# Patient Record
Sex: MALE | Race: WHITE | Employment: OTHER | URBAN - METROPOLITAN AREA
[De-identification: names, ages, dates, MRNs, and addresses within clinical notes are randomized per-mention and may not be internally consistent; named-entity substitution may affect disease eponyms.]

---

## 2020-07-07 ENCOUNTER — HOSPITAL ENCOUNTER (OUTPATIENT)
Dept: INFUSION THERAPY | Age: 85
Discharge: HOME | End: 2020-07-07
Payer: MEDICARE

## 2020-07-07 ENCOUNTER — HOSPITAL ENCOUNTER (OUTPATIENT)
Dept: INFUSION THERAPY | Age: 85
End: 2020-07-07
Payer: MEDICARE

## 2020-07-07 LAB
ALBUMIN SERPL-MCNC: 4.2 GM/DL (ref 3.4–5)
ALP BLD-CCNC: 121 IU/L (ref 40–129)
ALT SERPL-CCNC: 19 U/L (ref 10–40)
ANION GAP SERPL CALCULATED.3IONS-SCNC: 12 MMOL/L (ref 4–16)
AST SERPL-CCNC: 18 IU/L (ref 15–37)
BASOPHILS ABSOLUTE: 0 K/CU MM
BASOPHILS RELATIVE PERCENT: 0.1 % (ref 0–1)
BILIRUB SERPL-MCNC: 0.4 MG/DL (ref 0–1)
BUN BLDV-MCNC: 28 MG/DL (ref 6–23)
CALCIUM SERPL-MCNC: 9.1 MG/DL (ref 8.3–10.6)
CHLORIDE BLD-SCNC: 104 MMOL/L (ref 99–110)
CO2: 26 MMOL/L (ref 21–32)
CREAT SERPL-MCNC: 1.4 MG/DL (ref 0.9–1.3)
DIFFERENTIAL TYPE: ABNORMAL
EOSINOPHILS ABSOLUTE: 0.2 K/CU MM
EOSINOPHILS RELATIVE PERCENT: 1.2 % (ref 0–3)
GFR AFRICAN AMERICAN: 58 ML/MIN/1.73M2
GFR NON-AFRICAN AMERICAN: 48 ML/MIN/1.73M2
GLUCOSE BLD-MCNC: 119 MG/DL (ref 70–99)
HCT VFR BLD CALC: 47.4 % (ref 42–52)
HEMOGLOBIN: 15.8 GM/DL (ref 13.5–18)
LACTATE DEHYDROGENASE: 204 IU/L (ref 120–246)
LYMPHOCYTES ABSOLUTE: 6.5 K/CU MM
LYMPHOCYTES RELATIVE PERCENT: 48.6 % (ref 24–44)
MCH RBC QN AUTO: 30.2 PG (ref 27–31)
MCHC RBC AUTO-ENTMCNC: 33.3 % (ref 32–36)
MCV RBC AUTO: 90.5 FL (ref 78–100)
MONOCYTES ABSOLUTE: 0.6 K/CU MM
MONOCYTES RELATIVE PERCENT: 4.3 % (ref 0–4)
PDW BLD-RTO: 14.3 % (ref 11.7–14.9)
PLATELET # BLD: 209 K/CU MM (ref 140–440)
PMV BLD AUTO: 9.8 FL (ref 7.5–11.1)
POTASSIUM SERPL-SCNC: 4.9 MMOL/L (ref 3.5–5.1)
RBC # BLD: 5.24 M/CU MM (ref 4.6–6.2)
SEGMENTED NEUTROPHILS ABSOLUTE COUNT: 6.1 K/CU MM
SEGMENTED NEUTROPHILS RELATIVE PERCENT: 45.8 % (ref 36–66)
SODIUM BLD-SCNC: 142 MMOL/L (ref 135–145)
TOTAL PROTEIN: 6 GM/DL (ref 6.4–8.2)
WBC # BLD: 13.4 K/CU MM (ref 4–10.5)

## 2020-07-07 PROCEDURE — G0463 HOSPITAL OUTPT CLINIC VISIT: HCPCS

## 2020-07-07 PROCEDURE — 36415 COLL VENOUS BLD VENIPUNCTURE: CPT

## 2020-07-07 PROCEDURE — 85025 COMPLETE CBC W/AUTO DIFF WBC: CPT

## 2020-07-07 PROCEDURE — 83615 LACTATE (LD) (LDH) ENZYME: CPT

## 2020-07-07 PROCEDURE — 80053 COMPREHEN METABOLIC PANEL: CPT

## 2020-07-07 PROCEDURE — 99201 HC NEW PT, OUTPT VISIT LEVEL 1: CPT

## 2020-07-14 ENCOUNTER — OFFICE VISIT (OUTPATIENT)
Dept: INTERNAL MEDICINE CLINIC | Age: 85
End: 2020-07-14
Payer: MEDICARE

## 2020-07-14 VITALS
RESPIRATION RATE: 14 BRPM | DIASTOLIC BLOOD PRESSURE: 82 MMHG | BODY MASS INDEX: 23.94 KG/M2 | HEART RATE: 59 BPM | SYSTOLIC BLOOD PRESSURE: 138 MMHG | OXYGEN SATURATION: 97 % | HEIGHT: 71 IN | WEIGHT: 171 LBS

## 2020-07-14 PROCEDURE — 90670 PCV13 VACCINE IM: CPT | Performed by: INTERNAL MEDICINE

## 2020-07-14 PROCEDURE — 3023F SPIROM DOC REV: CPT | Performed by: INTERNAL MEDICINE

## 2020-07-14 PROCEDURE — 1123F ACP DISCUSS/DSCN MKR DOCD: CPT | Performed by: INTERNAL MEDICINE

## 2020-07-14 PROCEDURE — G8420 CALC BMI NORM PARAMETERS: HCPCS | Performed by: INTERNAL MEDICINE

## 2020-07-14 PROCEDURE — G8926 SPIRO NO PERF OR DOC: HCPCS | Performed by: INTERNAL MEDICINE

## 2020-07-14 PROCEDURE — 1036F TOBACCO NON-USER: CPT | Performed by: INTERNAL MEDICINE

## 2020-07-14 PROCEDURE — G0009 ADMIN PNEUMOCOCCAL VACCINE: HCPCS | Performed by: INTERNAL MEDICINE

## 2020-07-14 PROCEDURE — 4040F PNEUMOC VAC/ADMIN/RCVD: CPT | Performed by: INTERNAL MEDICINE

## 2020-07-14 PROCEDURE — 99204 OFFICE O/P NEW MOD 45 MIN: CPT | Performed by: INTERNAL MEDICINE

## 2020-07-14 PROCEDURE — G8427 DOCREV CUR MEDS BY ELIG CLIN: HCPCS | Performed by: INTERNAL MEDICINE

## 2020-07-14 RX ORDER — M-VIT,TX,IRON,MINS/CALC/FOLIC 27MG-0.4MG
1 TABLET ORAL DAILY
COMMUNITY

## 2020-07-14 SDOH — HEALTH STABILITY: MENTAL HEALTH: HOW OFTEN DO YOU HAVE A DRINK CONTAINING ALCOHOL?: NEVER

## 2020-07-14 ASSESSMENT — PATIENT HEALTH QUESTIONNAIRE - PHQ9
SUM OF ALL RESPONSES TO PHQ9 QUESTIONS 1 & 2: 0
SUM OF ALL RESPONSES TO PHQ QUESTIONS 1-9: 0
1. LITTLE INTEREST OR PLEASURE IN DOING THINGS: 0
SUM OF ALL RESPONSES TO PHQ QUESTIONS 1-9: 0
2. FEELING DOWN, DEPRESSED OR HOPELESS: 0

## 2020-07-14 NOTE — PROGRESS NOTES
tablet Take by mouth daily        No current facility-administered medications for this visit. Past Medical History:   Diagnosis Date    [facility] 2014    dx'd 2014, sees Dr Woody Child    COPD (chronic obstructive pulmonary disease) ([facility] 2018    INCIDENTAL FINDING ON CHEST CT 2018- NEVER SMOKED AND NO SX, NO INHALERS. No past surgical history on file. No family history on file. Social History     Socioeconomic History    Marital status:      Spouse name: Not on file    Number of children: Not on file    Years of education: Not on file    Highest education level: Not on file   Occupational History    Not on file   Social Needs    Financial resource strain: Not on file    Food insecurity     Worry: Not on file     Inability: Not on file    Transportation needs     Medical: Not on file     Non-medical: Not on file   Tobacco Use    Smoking status: Never Smoker    Smokeless tobacco: Never Used   Substance and Sexual Activity    Alcohol use: Never     Frequency: Never    Drug use: Never    Sexual activity: Not on file   Lifestyle    Physical activity     Days per week: Not on file     Minutes per session: Not on file    Stress: Not on file   Relationships    Social connections     Talks on phone: Not on file     Gets together: Not on file     Attends Yazdanism service: Not on file     Active member of club or organization: Not on file     Attends meetings of clubs or organizations: Not on file     Relationship status: Not on file    Intimate partner violence     Fear of current or ex partner: Not on file     Emotionally abused: Not on file     Physically abused: Not on file     Forced sexual activity: Not on file   Other Topics Concern    Not on file   Social History Narrative    Not on file       Review of Systems:  A comprehensive review of systems was negative except for what was noted in the HPI.     Physical Exam:   Vitals:    07/14/20 1004 Future    Simple chronic bronchitis ([facility]- ASYMPTOMATIC, NO INDICATION AT THIS POINT FOR INHALERS, GIVE PREVNAR, NEXT TIME PNEUMOVAX    Macular degeneration of right eye, unspecified type- F/U DR GONSALEZ    Mixed hyperlipidemia- F/U FASTING LAB  -     Comprehensive Metabolic Panel; Future  -     Lipid Panel; Future    Vitamin D deficiency- CHECK LEVELS  -     Vitamin D 25 Hydroxy; Future    Fatigue, unspecified type- SCR LAB  -     TSH without Reflex; Future  -     Vitamin B12; Future    Need for shingles vaccine  -     zoster recombinant adjuvanted vaccine [facility]) 50 MCG/0.5ML SUSR injection; Inject 0.5 mLs into the muscle once for 1 dose    Need for prophylactic vaccination against Streptococcus pneumoniae (pneumococcus)  -     PREVNAR 13 IM (Pneumococcal conjugate vaccine 13-valent)    Cardiac murmur- WILL CONSIDER ECHO LATER, SUSPECT CLINICALLY W CALCIFICATION OF AORTIC VALVE.

## 2020-10-01 NOTE — PROGRESS NOTES
Patient Name: Berny Pichardo  Patient : 1930  Patient MRN: [id number]     Primary Oncologist: Devorah Casper MD  Referring Provider: Aria Patton MD     Date of Service: 10/8/2020      Chief Complaint: No chief complaint on file. He came in for follow-up visit. Patient Active Problem List:     CLL (chronic lymphocytic leukemia) ([facility]     COPD (chronic obstructive pulmonary disease) ([facility]     Macular degeneration     Cardiac murmur     HPI:   Mr. Lanny Marinelli is a pleasant 80-year-old  male patient who is a retired general surgeon who was diagnosed with chronic lymphocytic leukemia In 2014. He had flow cytometry study. He was seen by Dr. Romeo Archuleta in [place]. He received IVIG in 2014 ×2 doses. In 2014 IgA was 114, IgG 1453, IgM 37. 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 He had anemia and blood tests in 2018 showed ferritin 353, iron binding 2056, saturation 13%, saturation 179. B12 was 1980. He is taking multivitamins and Hemoglobin went up to 15.4 in 2019 and remained stable in the range of 15. CT chest from 2018 showed:  1. Pulmonary emphysema with stable bilateral parenchymal scaring. 2. Stable multiple small subcentimeter pulmonary nodules. 3. Bilateral axillary and mediastinal adenopathy not significantly changed. Periportal adenopathy. 4. Worsening splenomegaly with splenic varices. He started Ibrutinib on May 16, 2018. On 2018 peripheral smear showed marketed lymphocytosis with smudge cells consistent with CLL. No blasts or acute leukemia transformation seen. Mild normocytic hypochromic anemia without schistocytes. WBC was 200.1, platelet 731, RBC 2.47. He has history of lung nodule. CT chest from 2018 showed stable lung nodules. No new investigations at this time given the progression in his CLL. Reportedly he has been compliant to his ibrutinib at 200 mg daily.   CT scan will be ordered for any signs of progression. So far he has been tolerating Ibrutinib. He already had refill. Reportedly he has been followed every 2 to 3 months by Dr. Deena Iqbal. On October 8, 2020 he came in for follow-up visit. He claims he has been adherent to his ibrutinib. He was seen by family doctor in August 2020. He gets to pneumonia shot. He will consider flu shot. Labs in July 2020 were reviewed. I will check labs today. He denies any B symptoms on today's visit. He denies any nausea, vomiting or diarrhea. No fever or chills. No intentional weight loss or night sweats. Past Medical History  Anemia of iron deficiency, CLL, macular degeneration, BPH. Surgical History  Colonoscopy, TURP for BPH In 2009. Left hernia repair in November 2018. Allergies  Reviewed as per chart. Medications  Reviewed as per chart. Social History  Smoking Status Never smoker  He denies any alcohol or illicit drug use. Family History  Mother had diabetes congestive heart failure. Son had cancer with metastatic disease to the liver. Review of Systems: \"Per interval history; otherwise 10 point ROS is negative. \"     Vital Signs: There were no vitals taken for this visit.     Physical Exam:  CONSTITUTIONAL: awake, alert, cooperative, no apparent distress   EYES: pupils equal, round and reactive to light, sclera clear and conjunctiva normal  ENT: Normocephalic, without obvious abnormality, atraumatic  NECK: supple, symmetrical, no jugular venous distension and no carotid bruits   HEMATOLOGIC/LYMPHATIC: no cervical, supraclavicular or axillary lymphadenopathy   LUNGS: no increased work of breathing and clear to auscultation   CARDIOVASCULAR: regular rate and rhythm, normal S1 and S2, + murmur noted  ABDOMEN: normal bowel sounds x 4, soft, non-distended, non-tender, no masses palpated, no hepatosplenomegaly   MUSCULOSKELETAL: full range of motion noted, tone is normal  NEUROLOGIC: awake, alert, oriented to name, place and time. Cranial nerves II through XII grossly intact. SKIN: Normal skin color, texture, turgor and no jaundice. appears intact   EXTREMITIES: no LE edema. No cyanosis. Labs:  Hematology:  Lab Results   Component Value Date    WBC 13.4 (H) 07/07/2020    RBC 5.24 07/07/2020    HGB 15.8 07/07/2020    HCT 47.4 07/07/2020    MCV 90.5 07/07/2020    MCH 30.2 07/07/2020    MCHC 33.3 07/07/2020    RDW 14.3 07/07/2020     07/07/2020    MPV 9.8 07/07/2020    SEGSPCT 45.8 07/07/2020    EOSRELPCT 1.2 07/07/2020    BASOPCT 0.1 07/07/2020    LYMPHOPCT 48.6 (H) 07/07/2020    MONOPCT 4.3 (H) 07/07/2020    SEGSABS 6.1 07/07/2020    EOSABS 0.2 07/07/2020    BASOSABS 0.0 07/07/2020    LYMPHSABS 6.5 07/07/2020    MONOSABS 0.6 07/07/2020    DIFFTYPE AUTOMATED DIFFERENTIAL 07/07/2020     Chemistry:  Lab Results   Component Value Date     07/07/2020    K 4.9 07/07/2020     07/07/2020    CO2 26 07/07/2020    BUN 28 (H) 07/07/2020    CREATININE 1.4 (H) 07/07/2020    GLUCOSE 119 (H) 07/07/2020    CALCIUM 9.1 07/07/2020    PROT 6.0 (L) 07/07/2020    LABALBU 4.2 07/07/2020    BILITOT 0.4 07/07/2020    ALKPHOS 121 07/07/2020    AST 18 07/07/2020    ALT 19 07/07/2020    LABGLOM 48 (L) 07/07/2020    GFRAA 58 (L) 07/07/2020     Lab Results   Component Value Date     07/07/2020     Immunology:  Lab Results   Component Value Date    PROT 6.0 (L) 07/07/2020      Observations:  No data recorded      Assessment & Plan:    1. 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 He started ibrutinib in May 2018. Labs in July 2020 were reviewed. I will have repeat CBC, CMP and LDH today. We discussed about follow-up imaging study if he develops symptoms to suggest progression of the disease. I will add a GVH study. I advised to call for the test result of the blood test.    2. He received IVIG x2 in the past. Follow-up immunoglobulin study was unremarkable. 3. He has history of anemia. Currently anemia is corrected.  He had colonoscopy. 4. We discussed about healthy diet and lifestyle. Return to clinic in 3 months or sooner. All of his question has been answered for today. Recent imaging and labs were reviewed and discussed with the patient.       Electronically signed by Aylin Roy MD on 10/1/20 at 7:42 AM EDT

## 2020-10-08 ENCOUNTER — HOSPITAL ENCOUNTER (OUTPATIENT)
Dept: INFUSION THERAPY | Age: 85
Discharge: HOME | End: 2020-10-08
Payer: MEDICARE

## 2020-10-08 ENCOUNTER — OFFICE VISIT (OUTPATIENT)
Dept: ONCOLOGY | Age: 85
End: 2020-10-08
Payer: MEDICARE

## 2020-10-08 VITALS
BODY MASS INDEX: 23.43 KG/M2 | DIASTOLIC BLOOD PRESSURE: 86 MMHG | TEMPERATURE: 97.8 F | HEIGHT: 72 IN | SYSTOLIC BLOOD PRESSURE: 167 MMHG | WEIGHT: 173 LBS | HEART RATE: 65 BPM | OXYGEN SATURATION: 93 %

## 2020-10-08 DIAGNOSIS — C91.10 CLL (CHRONIC LYMPHOCYTIC LEUKEMIA) (HCC): ICD-10-CM

## 2020-10-08 LAB
ALBUMIN SERPL-MCNC: 4.3 GM/DL (ref 3.4–5)
ALP BLD-CCNC: 113 IU/L (ref 40–128)
ALT SERPL-CCNC: 19 U/L (ref 10–40)
ANION GAP SERPL CALCULATED.3IONS-SCNC: 10 MMOL/L (ref 4–16)
AST SERPL-CCNC: 24 IU/L (ref 15–37)
BASOPHILS ABSOLUTE: 0 K/CU MM
BASOPHILS RELATIVE PERCENT: 0.1 % (ref 0–1)
BILIRUB SERPL-MCNC: 0.7 MG/DL (ref 0–1)
BUN BLDV-MCNC: 21 MG/DL (ref 6–23)
CALCIUM SERPL-MCNC: 9.3 MG/DL (ref 8.3–10.6)
CHLORIDE BLD-SCNC: 102 MMOL/L (ref 99–110)
CO2: 26 MMOL/L (ref 21–32)
CREAT SERPL-MCNC: 1.5 MG/DL (ref 0.9–1.3)
DIFFERENTIAL TYPE: ABNORMAL
EOSINOPHILS ABSOLUTE: 0.2 K/CU MM
EOSINOPHILS RELATIVE PERCENT: 1.1 % (ref 0–3)
GFR AFRICAN AMERICAN: 53 ML/MIN/1.73M2
GFR NON-AFRICAN AMERICAN: 44 ML/MIN/1.73M2
GLUCOSE BLD-MCNC: 97 MG/DL (ref 70–99)
HCT VFR BLD CALC: 46.2 % (ref 42–52)
HEMOGLOBIN: 15.4 GM/DL (ref 13.5–18)
LACTATE DEHYDROGENASE: 232 IU/L (ref 120–246)
LYMPHOCYTES ABSOLUTE: 8.1 K/CU MM
LYMPHOCYTES RELATIVE PERCENT: 52 % (ref 24–44)
MCH RBC QN AUTO: 30.4 PG (ref 27–31)
MCHC RBC AUTO-ENTMCNC: 33.3 % (ref 32–36)
MCV RBC AUTO: 91.1 FL (ref 78–100)
MONOCYTES ABSOLUTE: 0.9 K/CU MM
MONOCYTES RELATIVE PERCENT: 6 % (ref 0–4)
PDW BLD-RTO: 14.7 % (ref 11.7–14.9)
PLATELET # BLD: 174 K/CU MM (ref 140–440)
PMV BLD AUTO: 9.7 FL (ref 7.5–11.1)
POTASSIUM SERPL-SCNC: 4.7 MMOL/L (ref 3.5–5.1)
RBC # BLD: 5.07 M/CU MM (ref 4.6–6.2)
SEGMENTED NEUTROPHILS ABSOLUTE COUNT: 6.3 K/CU MM
SEGMENTED NEUTROPHILS RELATIVE PERCENT: 40.8 % (ref 36–66)
SODIUM BLD-SCNC: 138 MMOL/L (ref 135–145)
TOTAL PROTEIN: 5.8 GM/DL (ref 6.4–8.2)
WBC # BLD: 15.5 K/CU MM (ref 4–10.5)

## 2020-10-08 PROCEDURE — 36415 COLL VENOUS BLD VENIPUNCTURE: CPT

## 2020-10-08 PROCEDURE — 1123F ACP DISCUSS/DSCN MKR DOCD: CPT | Performed by: INTERNAL MEDICINE

## 2020-10-08 PROCEDURE — G8427 DOCREV CUR MEDS BY ELIG CLIN: HCPCS | Performed by: INTERNAL MEDICINE

## 2020-10-08 PROCEDURE — 1036F TOBACCO NON-USER: CPT | Performed by: INTERNAL MEDICINE

## 2020-10-08 PROCEDURE — 85025 COMPLETE CBC W/AUTO DIFF WBC: CPT

## 2020-10-08 PROCEDURE — 99213 OFFICE O/P EST LOW 20 MIN: CPT | Performed by: INTERNAL MEDICINE

## 2020-10-08 PROCEDURE — G8420 CALC BMI NORM PARAMETERS: HCPCS | Performed by: INTERNAL MEDICINE

## 2020-10-08 PROCEDURE — 83615 LACTATE (LD) (LDH) ENZYME: CPT

## 2020-10-08 PROCEDURE — G8484 FLU IMMUNIZE NO ADMIN: HCPCS | Performed by: INTERNAL MEDICINE

## 2020-10-08 PROCEDURE — 4040F PNEUMOC VAC/ADMIN/RCVD: CPT | Performed by: INTERNAL MEDICINE

## 2020-10-08 PROCEDURE — 99211 OFF/OP EST MAY X REQ PHY/QHP: CPT

## 2020-10-08 PROCEDURE — 80053 COMPREHEN METABOLIC PANEL: CPT

## 2020-10-08 ASSESSMENT — PATIENT HEALTH QUESTIONNAIRE - PHQ9
2. FEELING DOWN, DEPRESSED OR HOPELESS: 0
SUM OF ALL RESPONSES TO PHQ QUESTIONS 1-9: 0
SUM OF ALL RESPONSES TO PHQ9 QUESTIONS 1 & 2: 0
1. LITTLE INTEREST OR PLEASURE IN DOING THINGS: 0
SUM OF ALL RESPONSES TO PHQ QUESTIONS 1-9: 0

## 2020-10-17 LAB — IVGH MUTATION: NORMAL

## 2020-10-20 ENCOUNTER — OFFICE VISIT (OUTPATIENT)
Dept: INTERNAL MEDICINE CLINIC | Age: 85
End: 2020-10-20
Payer: MEDICARE

## 2020-10-20 VITALS
RESPIRATION RATE: 16 BRPM | SYSTOLIC BLOOD PRESSURE: 128 MMHG | OXYGEN SATURATION: 97 % | BODY MASS INDEX: 23.43 KG/M2 | WEIGHT: 173 LBS | HEART RATE: 63 BPM | DIASTOLIC BLOOD PRESSURE: 72 MMHG | HEIGHT: 72 IN

## 2020-10-20 PROCEDURE — 90732 PPSV23 VACC 2 YRS+ SUBQ/IM: CPT | Performed by: INTERNAL MEDICINE

## 2020-10-20 PROCEDURE — G0009 ADMIN PNEUMOCOCCAL VACCINE: HCPCS | Performed by: INTERNAL MEDICINE

## 2020-10-20 PROCEDURE — G8482 FLU IMMUNIZE ORDER/ADMIN: HCPCS | Performed by: INTERNAL MEDICINE

## 2020-10-20 PROCEDURE — 4040F PNEUMOC VAC/ADMIN/RCVD: CPT | Performed by: INTERNAL MEDICINE

## 2020-10-20 PROCEDURE — 1123F ACP DISCUSS/DSCN MKR DOCD: CPT | Performed by: INTERNAL MEDICINE

## 2020-10-20 PROCEDURE — G0439 PPPS, SUBSEQ VISIT: HCPCS | Performed by: INTERNAL MEDICINE

## 2020-10-20 ASSESSMENT — PATIENT HEALTH QUESTIONNAIRE - PHQ9
SUM OF ALL RESPONSES TO PHQ QUESTIONS 1-9: 0
SUM OF ALL RESPONSES TO PHQ QUESTIONS 1-9: 0
1. LITTLE INTEREST OR PLEASURE IN DOING THINGS: 0
SUM OF ALL RESPONSES TO PHQ9 QUESTIONS 1 & 2: 0
2. FEELING DOWN, DEPRESSED OR HOPELESS: 0
SUM OF ALL RESPONSES TO PHQ QUESTIONS 1-9: 0

## 2020-10-20 ASSESSMENT — LIFESTYLE VARIABLES: HOW OFTEN DO YOU HAVE A DRINK CONTAINING ALCOHOL: 0

## 2020-10-20 NOTE — PROGRESS NOTES
Medicare Annual Wellness Visit  Name: Hiro Eye Date: 10/20/2020   MRN: [id number] Sex: Male   Age: 80 y.o. Ethnicity: Non-/Non    : 1930 Race: Iman Young is here for Medicare AWV    Screenings for behavioral, psychosocial and functional/safety risks, and cognitive dysfunction are all negative except as indicated below. These results, as well as other patient data from the [address] form, are documented in Flowsheets linked to this Encounter. LEUKEMIA, SEEING DR Araseli Lara AND COUNTS ALSO LDH STABLE. HAD FLU SHOT LAST WEEK    DUE FOR PNEUMOVAX. No Known Allergies    Prior to Visit Medications    Medication Sig Taking? Authorizing Provider   Multiple Vitamins-Minerals (THERAPEUTIC MULTIVITAMIN-MINERALS) tablet Take 1 tablet by mouth daily Yes Historical Provider, MD   ibrutinib (IMBRUVICA) 420 MG tablet Take by mouth daily  Yes Historical Provider, MD       Past Medical History:   Diagnosis Date    Cardiac murmur     DEANNA noted on exam 20--- monitoring for now, consider echo    CLL (chronic lymphocytic leukemia) ([facility]     dx'd , sees Dr Aylin Roy    COPD (chronic obstructive pulmonary disease) ([facility]     INCIDENTAL FINDING ON CHEST CT 2018- NEVER SMOKED AND NO SX, NO INHALERS.  Macular degeneration     R>L, gets injections, is to see Dr Loree Phoenix.        Past Surgical History:   Procedure Laterality Date    INGUINAL HERNIA REPAIR      remote, unsure which side       Family History   Problem Relation Age of Onset    Diabetes Mother     ADHD Father        CareTeam (Including outside providers/suppliers regularly involved in providing care):   Patient Care Team:  Omar Boudreaux MD as PCP - General (Internal Medicine)  Omar Boudreaux MD as PCP - [facility] Empaneled Provider    Wt Readings from Last 3 Encounters:   10/20/20 173 lb (78.5 kg)   10/08/20 173 lb (78.5 kg)   20 171 lb (77.6 kg)     Vitals:    10/20/20 1011   BP: 128/72   Pulse: 63   Resp: 16   SpO2: 97%   Weight: 173 lb (78.5 kg)   Height: 5' 11.5\" (1.816 m)     Body mass index is 23.79 kg/m². Based upon direct observation of the patient, evaluation of cognition reveals recent and remote memory intact. General Appearance: alert and oriented to person, place and time, well developed and well- nourished, in no acute distress  Skin: warm and dry, no rash or erythema  Head: normocephalic and atraumatic  Eyes: pupils equal, round, and reactive to light, extraocular eye movements intact, conjunctivae normal  ENT: tympanic membrane, external ear and ear canal normal bilaterally, nose without deformity, nasal mucosa and turbinates normal without polyps  Neck: supple and non-tender without mass, no thyromegaly or thyroid nodules, no cervical lymphadenopathy  Pulmonary/Chest: clear to auscultation bilaterally- no wheezes, rales or rhonchi, normal air movement, no respiratory distress  Cardiovascular: normal rate, regular rhythm, normal S1 and S2, DEANNA AT LLSB RAD TO NECK, no  rubs, clicks, or gallops, distal pulses intact, no carotid bruits  Abdomen: soft, non-tender, non-distended, normal bowel sounds, no masses or organomegaly  Extremities: no cyanosis, clubbing or edema  Musculoskeletal: normal range of motion, no joint swelling, deformity or tenderness  Neurologic:  no cranial nerve deficit, gait, coordination and speech normal    Patient's complete Health Risk Assessment and screening values have been reviewed and are found in Flowsheets. The following problems were reviewed today and where indicated follow up appointments were made and/or referrals ordered. Positive Risk Factor Screenings with Interventions:     General Health and ACP:  General  In general, how would you say your health is?: Very Good  In the past 7 days, have you experienced any of the following?  New or Increased Pain, New or Increased Fatigue, Loneliness, Social Isolation, Stress or Anger?: None of These  Do you get the social and emotional support that you need?: Yes  Do you have a Living Will?: Yes  Advance Directives     Power of  Living Will ACP-Advance Directive ACP-Power of     Not on File Not on File Filed [address] Risk Interventions:  · HAS LW    Health Habits/Nutrition:  Health Habits/Nutrition  Do you exercise for at least 20 minutes 2-3 times per week?: (!) No  Have you lost any weight without trying in the past 3 months?: No  Do you eat fewer than 2 meals per day?: No  Body mass index: 23.79  Health Habits/Nutrition Interventions:  · WALKS 2MI/DAY 6D/WEEK. Hearing/Vision:  No exam data present  Hearing/Vision  Do you or your family notice any trouble with your hearing?: No  Do you have difficulty driving, watching TV, or doing any of your daily activities because of your eyesight?: (!) Yes  Have you had an eye exam within the past year?: Yes  Hearing/Vision Interventions:  · [address], HAS BASELINE DECR VISION    ADL:  ADLs  In the past 7 days, did you need help from others to perform any of the following everyday activities? Eating, dressing, grooming, bathing, toileting, or walking/balance?: None  In the past 7 days, did you need help from others to take care of any of the following?  Laundry, housekeeping, banking/finances, shopping, telephone use, food preparation, transportation, or taking medications?: (!) Transportation  ADL Interventions:  · [address] [facility]    Personalized Preventive Plan   Current Health Maintenance Status  Immunization History   Administered Date(s) Administered    Influenza, High Dose (Fluzone 65 yrs and older) 10/12/2020    Pneumococcal Conjugate 13-valent (Genette Guise) 07/14/2020        Health Maintenance   Topic Date Due    DTaP/Tdap/Td vaccine (1 - Tdap) 01/24/1949    Shingles Vaccine (1 of 2) 01/24/1980    Annual Wellness Visit (AWV)  07/12/2020    Pneumococcal 65+ yrs at Risk Vaccine (2 of 2 - PPSV23) 07/14/2021    Flu vaccine  Completed    Hepatitis A vaccine  Aged Out    Hepatitis B vaccine  Aged Out    Hib vaccine  Aged Out    Meningococcal (ACWY) vaccine  Aged Out     Recommendations for Bleacher Report Due: see orders and patient instructions/AVS.  . Recommended screening schedule for the next 5-10 years is provided to the patient in written form: see Patient Cristina Neville was seen today for medicare awv. Diagnoses and all orders for this visit:    Routine general medical examination at a health care facility - remains independent, functional and active, no indications/needs for other interventions noted at this time- except as noted below and also findings noted on screening medicare questions. Hyperlipemia, mixed  - Pt will continue to work on a low fat diet and also exercise, wt loss as appropriate. Will continue periodic monitoring of fasting lipid profile, glucose, liver function. To draw lab w next testing fr Dr Ace Marquez  -     TSH with Reflex; Future  -     LIPID PANEL; Future    CLL (chronic lymphocytic leukemia) ([facility]- stable monitored by Dr Ace Marquez    Cardiac murmur- suspect AS, denies sx lt headedness, cp or sob.   Monitor, pt refused/defers echo or cardiology eval.    Macular degeneration of right eye, unspecified type- cont inj w Dr Zonia Reyes    Vitamin D deficiency- check levels  -     VITAMIN D 25 HYDROXY; Future    Need for prophylactic vaccination against Streptococcus pneumoniae (pneumococcus)  -     PNEUMOVAX 23 subcutaneous/IM (Pneumococcal polysaccharide vaccine 23-valent >= 1yo)

## 2020-10-20 NOTE — PATIENT INSTRUCTIONS
Personalized Preventive Plan for Brayan León - 10/20/2020  Medicare offers a range of preventive health benefits. Some of the tests and screenings are paid in full while other may be subject to a deductible, co-insurance, and/or copay. Some of these benefits include a comprehensive review of your medical history including lifestyle, illnesses that may run in your family, and various assessments and screenings as appropriate. After reviewing your medical record and screening and assessments performed today your provider may have ordered immunizations, labs, imaging, and/or referrals for you. A list of these orders (if applicable) as well as your Preventive Care list are included within your After Visit Summary for your review. Other Preventive Recommendations:    · A preventive eye exam performed by an eye specialist is recommended every 1-2 years to screen for glaucoma; cataracts, macular degeneration, and other eye disorders. · A preventive dental visit is recommended every 6 months. · Try to get at least 150 minutes of exercise per week or 10,000 steps per day on a pedometer . · Order or download the FREE \"Exercise & Physical Activity: Your Everyday Guide\" from [employer]. Call 9-912.468.7750 or search [employer] online. · You need 9775-5377 mg of calcium and 8756-2465 IU of vitamin D per day. It is possible to meet your calcium requirement with diet alone, but a vitamin D supplement is usually necessary to meet this goal.  · When exposed to the sun, use a sunscreen that protects against both UVA and UVB radiation with an SPF of 30 or greater. Reapply every 2 to 3 hours or after sweating, drying off with a towel, or swimming. · Always wear a seat belt when traveling in a car. Always wear a helmet when riding a bicycle or motorcycle.

## 2021-01-05 NOTE — PROGRESS NOTES
Patient Name: Mark Joel  Patient : 1930  Patient MRN: [id number]     Primary Oncologist: Casey Baumann MD  Referring Provider: Fabio Simpson MD     Date of Service: 2021      Chief Complaint:   Chief Complaint   Patient presents with    Follow-up     He came in for follow-up visit. 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 He had flow cytometry study. He was seen by Dr. Cathy Ramey in [place]. He received IVIG in 2014 ×2 doses. In 2014 IgA was 114, IgG 1453, IgM 37. 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 He had anemia and blood tests in 2018 showed ferritin 353, iron binding 2056, saturation 13%, saturation 179. B12 was 1980. He is taking multivitamins and Hemoglobin went up to 15.4 in 2019 and remained stable in the range of 15. CT chest from 2018 showed:  1. Pulmonary emphysema with stable bilateral parenchymal scaring. 2. Stable multiple small subcentimeter pulmonary nodules. 3. Bilateral axillary and mediastinal adenopathy not significantly changed. Periportal adenopathy. 4. Worsening splenomegaly with splenic varices. He started Ibrutinib on May 16, 2018. On 2018 peripheral smear showed marketed lymphocytosis with smudge cells consistent with CLL. No blasts or acute leukemia transformation seen. Mild normocytic hypochromic anemia without schistocytes. WBC was 200.1, platelet 349, RBC 9.38. He has history of lung nodule. CT chest from 2018 showed stable lung nodules. No new investigations at this time given the progression in his CLL.   Reportedly he has been compliant to his ibrutinib at 200 mg daily. CT scan will be ordered for any signs of progression. So far he has been tolerating Ibrutinib. He already had refill. Reportedly he has been followed every 2 to 3 months by Dr. Cathy Ramey. He was seen by family doctor in August 2020. Labs in July 2020 were reviewed. On January 12, 2021 he came in for follow-up visit. IGVH study showed admitted unmutated result which is unfavorable. He had Covid vaccine on January 10, 2021. He denied any side effects of the COVID-19 vaccine. He had flu shot in 2020. He claims he has been taking his ibrutinib. We will check labs today including LDH CBC and CMP. He denies any B symptoms on today's visit. He denies any nausea, vomiting or diarrhea. No fever or chills. Denied any intentional weight loss or night sweats. Past Medical History  Anemia of iron deficiency, CLL, macular degeneration, BPH. Surgical History  Colonoscopy, TURP for BPH In 2009. Left hernia repair in November 2018. Social History  Smoking Status Never smoker  He denies any alcohol or illicit drug use. Family History  Mother had diabetes congestive heart failure. Son had cancer with metastatic disease to the liver. Review of Systems: \"Per interval history; otherwise 10 point ROS is negative. \"     Vital Signs:  BP (!) 168/92 (Site: Right Upper Arm, Position: Sitting, Cuff Size: Medium Adult)   Pulse 65   Temp 96.8 °F (36 °C) (Infrared)   Resp 16   Ht 5' 11.5\" (1.816 m)   Wt 174 lb (78.9 kg)   SpO2 95%   BMI 23.93 kg/m²     Physical Exam:  CONSTITUTIONAL: awake, alert, cooperative, no apparent distress   EYES: pupils equal, round and reactive to light, sclera clear and conjunctiva normal  ENT: Normocephalic, without obvious abnormality, atraumatic  NECK: supple, symmetrical, no jugular venous distension and no carotid bruits   HEMATOLOGIC/LYMPHATIC: no cervical, supraclavicular or axillary lymphadenopathy   LUNGS: no increased work of breathing and clear to auscultation   CARDIOVASCULAR: regular rate and rhythm, normal S1 and S2, + murmur noted  ABDOMEN: normal bowel sounds x 4, soft, non-distended, non-tender, no masses palpated, no hepatosplenomegaly   MUSCULOSKELETAL: full range of motion noted, tone is normal  NEUROLOGIC: awake, alert, oriented to name, place and time. Cranial nerves II through XII grossly intact. Grossly there is no neuro focal deficit. SKIN: No jaundice. Skin. EXTREMITIES: no LE edema. No cyanosis. Labs:  Hematology:  Lab Results   Component Value Date    WBC 15.5 (H) 10/08/2020    RBC 5.07 10/08/2020    HGB 15.4 10/08/2020    HCT 46.2 10/08/2020    MCV 91.1 10/08/2020    MCH 30.4 10/08/2020    MCHC 33.3 10/08/2020    RDW 14.7 10/08/2020     10/08/2020    MPV 9.7 10/08/2020    SEGSPCT 40.8 10/08/2020    EOSRELPCT 1.1 10/08/2020    BASOPCT 0.1 10/08/2020    LYMPHOPCT 52.0 (H) 10/08/2020    MONOPCT 6.0 (H) 10/08/2020    SEGSABS 6.3 10/08/2020    EOSABS 0.2 10/08/2020    BASOSABS 0.0 10/08/2020    LYMPHSABS 8.1 10/08/2020    MONOSABS 0.9 10/08/2020    DIFFTYPE AUTOMATED DIFFERENTIAL 10/08/2020     Chemistry:  Lab Results   Component Value Date     10/08/2020    K 4.7 10/08/2020     10/08/2020    CO2 26 10/08/2020    BUN 21 10/08/2020    CREATININE 1.5 (H) 10/08/2020    GLUCOSE 97 10/08/2020    CALCIUM 9.3 10/08/2020    PROT 5.8 (L) 10/08/2020    LABALBU 4.3 10/08/2020    BILITOT 0.7 10/08/2020    ALKPHOS 113 10/08/2020    AST 24 10/08/2020    ALT 19 10/08/2020    LABGLOM 44 (L) 10/08/2020    GFRAA 53 (L) 10/08/2020     Lab Results   Component Value Date     10/08/2020     Immunology:  Lab Results   Component Value Date    PROT 5.8 (L) 10/08/2020      Observations:  PHQ-9 Total Score: 0 (1/12/2021  9:20 AM)        Assessment & Plan:    1. 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 He started ibrutinib in May 2018. Labs in July 2020 were reviewed. He has unmutated IGVH study. He has been adherent to his ibrutinib. I will check labs today and advised to call for the test result. 2. He received IVIG x2 in the past. Follow-up immunoglobulin study was unremarkable. 3. He has history of anemia. Currently anemia is corrected. He had colonoscopy. 4. We discussed about healthy diet and lifestyle. He had COVID-19 vaccine in January 2021. Return to clinic in 6 months or sooner. All of his question has been answered for today. Recent imaging and labs were reviewed and discussed with the patient.       Electronically signed by Rachel Hubbard MD on 10/1/20 at 7:42 AM EDT

## 2021-01-12 ENCOUNTER — HOSPITAL ENCOUNTER (OUTPATIENT)
Dept: INFUSION THERAPY | Age: 86
Discharge: HOME | End: 2021-01-12
Payer: MEDICARE

## 2021-01-12 ENCOUNTER — OFFICE VISIT (OUTPATIENT)
Dept: ONCOLOGY | Age: 86
End: 2021-01-12
Payer: MEDICARE

## 2021-01-12 VITALS
SYSTOLIC BLOOD PRESSURE: 168 MMHG | TEMPERATURE: 96.8 F | HEIGHT: 72 IN | OXYGEN SATURATION: 95 % | BODY MASS INDEX: 23.57 KG/M2 | DIASTOLIC BLOOD PRESSURE: 92 MMHG | RESPIRATION RATE: 16 BRPM | WEIGHT: 174 LBS | HEART RATE: 65 BPM

## 2021-01-12 DIAGNOSIS — C91.10 CLL (CHRONIC LYMPHOCYTIC LEUKEMIA) (HCC): Primary | ICD-10-CM

## 2021-01-12 DIAGNOSIS — C91.10 CLL (CHRONIC LYMPHOCYTIC LEUKEMIA) (HCC): ICD-10-CM

## 2021-01-12 LAB
ALBUMIN SERPL-MCNC: 4.4 GM/DL (ref 3.4–5)
ALP BLD-CCNC: 113 IU/L (ref 40–128)
ALT SERPL-CCNC: 17 U/L (ref 10–40)
ANION GAP SERPL CALCULATED.3IONS-SCNC: 11 MMOL/L (ref 4–16)
AST SERPL-CCNC: 21 IU/L (ref 15–37)
BASOPHILS ABSOLUTE: 0 K/CU MM
BASOPHILS RELATIVE PERCENT: 0.2 % (ref 0–1)
BILIRUB SERPL-MCNC: 0.8 MG/DL (ref 0–1)
BUN BLDV-MCNC: 22 MG/DL (ref 6–23)
CALCIUM SERPL-MCNC: 9.2 MG/DL (ref 8.3–10.6)
CHLORIDE BLD-SCNC: 103 MMOL/L (ref 99–110)
CO2: 27 MMOL/L (ref 21–32)
CREAT SERPL-MCNC: 1.5 MG/DL (ref 0.9–1.3)
DIFFERENTIAL TYPE: ABNORMAL
EOSINOPHILS ABSOLUTE: 0.1 K/CU MM
EOSINOPHILS RELATIVE PERCENT: 0.8 % (ref 0–3)
GFR AFRICAN AMERICAN: 53 ML/MIN/1.73M2
GFR NON-AFRICAN AMERICAN: 44 ML/MIN/1.73M2
GLUCOSE BLD-MCNC: 99 MG/DL (ref 70–99)
HCT VFR BLD CALC: 49.8 % (ref 42–52)
HEMOGLOBIN: 16.4 GM/DL (ref 13.5–18)
LACTATE DEHYDROGENASE: 231 IU/L (ref 120–246)
LYMPHOCYTES ABSOLUTE: 9.3 K/CU MM
LYMPHOCYTES RELATIVE PERCENT: 54.8 % (ref 24–44)
MCH RBC QN AUTO: 30 PG (ref 27–31)
MCHC RBC AUTO-ENTMCNC: 32.9 % (ref 32–36)
MCV RBC AUTO: 91.2 FL (ref 78–100)
MONOCYTES ABSOLUTE: 0.8 K/CU MM
MONOCYTES RELATIVE PERCENT: 4.9 % (ref 0–4)
PDW BLD-RTO: 14.8 % (ref 11.7–14.9)
PLATELET # BLD: 208 K/CU MM (ref 140–440)
PMV BLD AUTO: 10.1 FL (ref 7.5–11.1)
POTASSIUM SERPL-SCNC: 4.7 MMOL/L (ref 3.5–5.1)
RBC # BLD: 5.46 M/CU MM (ref 4.6–6.2)
SEGMENTED NEUTROPHILS ABSOLUTE COUNT: 6.7 K/CU MM
SEGMENTED NEUTROPHILS RELATIVE PERCENT: 39.3 % (ref 36–66)
SODIUM BLD-SCNC: 141 MMOL/L (ref 135–145)
TOTAL PROTEIN: 6 GM/DL (ref 6.4–8.2)
WBC # BLD: 17 K/CU MM (ref 4–10.5)

## 2021-01-12 PROCEDURE — G8420 CALC BMI NORM PARAMETERS: HCPCS | Performed by: INTERNAL MEDICINE

## 2021-01-12 PROCEDURE — 1123F ACP DISCUSS/DSCN MKR DOCD: CPT | Performed by: INTERNAL MEDICINE

## 2021-01-12 PROCEDURE — 80053 COMPREHEN METABOLIC PANEL: CPT

## 2021-01-12 PROCEDURE — G8482 FLU IMMUNIZE ORDER/ADMIN: HCPCS | Performed by: INTERNAL MEDICINE

## 2021-01-12 PROCEDURE — 1036F TOBACCO NON-USER: CPT | Performed by: INTERNAL MEDICINE

## 2021-01-12 PROCEDURE — 99213 OFFICE O/P EST LOW 20 MIN: CPT | Performed by: INTERNAL MEDICINE

## 2021-01-12 PROCEDURE — 4040F PNEUMOC VAC/ADMIN/RCVD: CPT | Performed by: INTERNAL MEDICINE

## 2021-01-12 PROCEDURE — 85025 COMPLETE CBC W/AUTO DIFF WBC: CPT

## 2021-01-12 PROCEDURE — 36415 COLL VENOUS BLD VENIPUNCTURE: CPT

## 2021-01-12 PROCEDURE — G8427 DOCREV CUR MEDS BY ELIG CLIN: HCPCS | Performed by: INTERNAL MEDICINE

## 2021-01-12 PROCEDURE — 99211 OFF/OP EST MAY X REQ PHY/QHP: CPT

## 2021-01-12 PROCEDURE — 83615 LACTATE (LD) (LDH) ENZYME: CPT

## 2021-01-12 ASSESSMENT — PATIENT HEALTH QUESTIONNAIRE - PHQ9
SUM OF ALL RESPONSES TO PHQ9 QUESTIONS 1 & 2: 0
SUM OF ALL RESPONSES TO PHQ QUESTIONS 1-9: 0
SUM OF ALL RESPONSES TO PHQ QUESTIONS 1-9: 0
2. FEELING DOWN, DEPRESSED OR HOPELESS: 0
1. LITTLE INTEREST OR PLEASURE IN DOING THINGS: 0

## 2021-01-12 NOTE — PROGRESS NOTES
MA Rooming Questions  Patient: Tiago Demarco  MRN: [id number]    Date: 1/12/2021        1. Do you have any new issues?   no         2. Do you need any refills on medications?    no    3. Have you had any imaging done since your last visit?   no    4. Have you been hospitalized or seen in the emergency room since your last visit here?   no    5. Did the patient have a depression screening completed today?  Yes    PHQ-9 Total Score: 0 (1/12/2021  9:20 AM)       PHQ-9 Given to (if applicable):               PHQ-9 Score (if applicable):                     [] Positive     []  Negative              Does question #9 need addressed (if applicable)                     [] Yes    []  No               Christine Regan MA

## 2021-04-06 ENCOUNTER — APPOINTMENT (OUTPATIENT)
Dept: GENERAL RADIOLOGY | Age: 86
DRG: 177 | End: 2021-04-06
Payer: MEDICARE

## 2021-04-06 ENCOUNTER — HOSPITAL ENCOUNTER (INPATIENT)
Age: 86
LOS: 4 days | Discharge: HOME | DRG: 177 | End: 2021-04-10
Attending: INTERNAL MEDICINE | Admitting: INTERNAL MEDICINE
Payer: MEDICARE

## 2021-04-06 DIAGNOSIS — U07.1 COVID-19: Primary | ICD-10-CM

## 2021-04-06 DIAGNOSIS — R09.02 HYPOXIA: ICD-10-CM

## 2021-04-06 DIAGNOSIS — R77.8 ELEVATED TROPONIN: ICD-10-CM

## 2021-04-06 DIAGNOSIS — R53.83 FATIGUE, UNSPECIFIED TYPE: ICD-10-CM

## 2021-04-06 LAB
ADENOVIRUS DETECTION BY PCR: NOT DETECTED
ALBUMIN SERPL-MCNC: 3 GM/DL (ref 3.4–5)
ALP BLD-CCNC: 72 IU/L (ref 40–129)
ALT SERPL-CCNC: 15 U/L (ref 10–40)
ANION GAP SERPL CALCULATED.3IONS-SCNC: 9 MMOL/L (ref 4–16)
AST SERPL-CCNC: 33 IU/L (ref 15–37)
BASE EXCESS: 1 (ref 0–3.3)
BASOPHILS ABSOLUTE: 0 K/CU MM
BASOPHILS RELATIVE PERCENT: 0 % (ref 0–1)
BILIRUB SERPL-MCNC: 0.6 MG/DL (ref 0–1)
BORDETELLA PARAPERTUSSIS BY PCR: NOT DETECTED
BORDETELLA PERTUSSIS PCR: NOT DETECTED
BUN BLDV-MCNC: 37 MG/DL (ref 6–23)
CALCIUM SERPL-MCNC: 8.1 MG/DL (ref 8.3–10.6)
CHLAMYDOPHILA PNEUMONIA PCR: NOT DETECTED
CHLORIDE BLD-SCNC: 107 MMOL/L (ref 99–110)
CO2: 24 MMOL/L (ref 21–32)
CORONAVIRUS 229E PCR: NOT DETECTED
CORONAVIRUS HKU1 PCR: NOT DETECTED
CORONAVIRUS NL63 PCR: NOT DETECTED
CORONAVIRUS OC43 PCR: NOT DETECTED
CREAT SERPL-MCNC: 1.7 MG/DL (ref 0.9–1.3)
D DIMER: 274 NG/ML(DDU)
DIFFERENTIAL TYPE: ABNORMAL
EOSINOPHILS ABSOLUTE: 0 K/CU MM
EOSINOPHILS RELATIVE PERCENT: 0 % (ref 0–3)
GFR AFRICAN AMERICAN: 46 ML/MIN/1.73M2
GFR NON-AFRICAN AMERICAN: 38 ML/MIN/1.73M2
GLUCOSE BLD-MCNC: 121 MG/DL (ref 70–99)
HCO3 VENOUS: 24.3 MMOL/L (ref 19–25)
HCT VFR BLD CALC: 45.7 % (ref 42–52)
HEMOGLOBIN: 15 GM/DL (ref 13.5–18)
HIGH SENSITIVE C-REACTIVE PROTEIN: 54.5 MG/L
HUMAN METAPNEUMOVIRUS PCR: NOT DETECTED
IMMATURE NEUTROPHIL %: 0.3 % (ref 0–0.43)
INFLUENZA A BY PCR: NOT DETECTED
INFLUENZA A H1 (2009) PCR: NOT DETECTED
INFLUENZA A H1 PANDEMIC PCR: NOT DETECTED
INFLUENZA A H3 PCR: NOT DETECTED
INFLUENZA B BY PCR: NOT DETECTED
LACTATE: 1.7 MMOL/L (ref 0.4–2)
LYMPHOCYTES ABSOLUTE: 3.6 K/CU MM
LYMPHOCYTES RELATIVE PERCENT: 38.8 % (ref 24–44)
MCH RBC QN AUTO: 30.3 PG (ref 27–31)
MCHC RBC AUTO-ENTMCNC: 32.8 % (ref 32–36)
MCV RBC AUTO: 92.3 FL (ref 78–100)
MONOCYTES ABSOLUTE: 0.6 K/CU MM
MONOCYTES RELATIVE PERCENT: 6.3 % (ref 0–4)
MYCOPLASMA PNEUMONIAE PCR: NOT DETECTED
NUCLEATED RBC %: 0 %
O2 SAT, VEN: 92.9 % (ref 50–70)
PARAINFLUENZA 1 PCR: NOT DETECTED
PARAINFLUENZA 2 PCR: NOT DETECTED
PARAINFLUENZA 3 PCR: NOT DETECTED
PARAINFLUENZA 4 PCR: NOT DETECTED
PCO2, VEN: 41 MMHG (ref 38–52)
PDW BLD-RTO: 14.2 % (ref 11.7–14.9)
PH VENOUS: 7.38 (ref 7.32–7.42)
PLATELET # BLD: 184 K/CU MM (ref 140–440)
PMV BLD AUTO: 10.4 FL (ref 7.5–11.1)
PO2, VEN: 83 MMHG (ref 28–48)
POTASSIUM SERPL-SCNC: 4.4 MMOL/L (ref 3.5–5.1)
PRO-BNP: 862.2 PG/ML
RBC # BLD: 4.95 M/CU MM (ref 4.6–6.2)
RHINOVIRUS ENTEROVIRUS PCR: NOT DETECTED
RSV PCR: NOT DETECTED
SARS-COV-2, NAAT: DETECTED
SARS-COV-2: ABNORMAL
SEGMENTED NEUTROPHILS ABSOLUTE COUNT: 5.1 K/CU MM
SEGMENTED NEUTROPHILS RELATIVE PERCENT: 54.6 % (ref 36–66)
SODIUM BLD-SCNC: 140 MMOL/L (ref 135–145)
SOURCE: ABNORMAL
TOTAL IMMATURE NEUTOROPHIL: 0.03 K/CU MM
TOTAL NUCLEATED RBC: 0 K/CU MM
TOTAL PROTEIN: 5.2 GM/DL (ref 6.4–8.2)
TROPONIN T: 0.01 NG/ML
WBC # BLD: 9.3 K/CU MM (ref 4–10.5)

## 2021-04-06 PROCEDURE — 85025 COMPLETE CBC W/AUTO DIFF WBC: CPT

## 2021-04-06 PROCEDURE — 86141 C-REACTIVE PROTEIN HS: CPT

## 2021-04-06 PROCEDURE — 83605 ASSAY OF LACTIC ACID: CPT

## 2021-04-06 PROCEDURE — 2060000000 HC ICU INTERMEDIATE R&B

## 2021-04-06 PROCEDURE — 80053 COMPREHEN METABOLIC PANEL: CPT

## 2021-04-06 PROCEDURE — 85379 FIBRIN DEGRADATION QUANT: CPT

## 2021-04-06 PROCEDURE — 6360000002 HC RX W HCPCS: Performed by: PHYSICIAN ASSISTANT

## 2021-04-06 PROCEDURE — 99285 EMERGENCY DEPT VISIT HI MDM: CPT

## 2021-04-06 PROCEDURE — 82805 BLOOD GASES W/O2 SATURATION: CPT

## 2021-04-06 PROCEDURE — 6370000000 HC RX 637 (ALT 250 FOR IP): Performed by: PHYSICIAN ASSISTANT

## 2021-04-06 PROCEDURE — 93005 ELECTROCARDIOGRAM TRACING: CPT | Performed by: EMERGENCY MEDICINE

## 2021-04-06 PROCEDURE — 83880 ASSAY OF NATRIURETIC PEPTIDE: CPT

## 2021-04-06 PROCEDURE — 96374 THER/PROPH/DIAG INJ IV PUSH: CPT

## 2021-04-06 PROCEDURE — XW033H5 INTRODUCTION OF TOCILIZUMAB INTO PERIPHERAL VEIN, PERCUTANEOUS APPROACH, NEW TECHNOLOGY GROUP 5: ICD-10-PCS | Performed by: INTERNAL MEDICINE

## 2021-04-06 PROCEDURE — 71045 X-RAY EXAM CHEST 1 VIEW: CPT

## 2021-04-06 PROCEDURE — 0202U NFCT DS 22 TRGT SARS-COV-2: CPT

## 2021-04-06 PROCEDURE — 84484 ASSAY OF TROPONIN QUANT: CPT

## 2021-04-06 PROCEDURE — 2580000003 HC RX 258: Performed by: INTERNAL MEDICINE

## 2021-04-06 PROCEDURE — 87635 SARS-COV-2 COVID-19 AMP PRB: CPT

## 2021-04-06 RX ORDER — POLYETHYLENE GLYCOL 3350 17 G/17G
17 POWDER, FOR SOLUTION ORAL DAILY PRN
Status: DISCONTINUED | OUTPATIENT
Start: 2021-04-06 | End: 2021-04-10 | Stop reason: HOSPADM

## 2021-04-06 RX ORDER — POTASSIUM CHLORIDE 7.45 MG/ML
10 INJECTION INTRAVENOUS PRN
Status: DISCONTINUED | OUTPATIENT
Start: 2021-04-06 | End: 2021-04-10 | Stop reason: HOSPADM

## 2021-04-06 RX ORDER — VITAMIN B COMPLEX
2000 TABLET ORAL DAILY
Status: DISCONTINUED | OUTPATIENT
Start: 2021-04-07 | End: 2021-04-10 | Stop reason: HOSPADM

## 2021-04-06 RX ORDER — SODIUM CHLORIDE 0.9 % (FLUSH) 0.9 %
5-40 SYRINGE (ML) INJECTION EVERY 12 HOURS SCHEDULED
Status: DISCONTINUED | OUTPATIENT
Start: 2021-04-06 | End: 2021-04-10 | Stop reason: HOSPADM

## 2021-04-06 RX ORDER — SODIUM CHLORIDE 9 MG/ML
25 INJECTION, SOLUTION INTRAVENOUS PRN
Status: DISCONTINUED | OUTPATIENT
Start: 2021-04-06 | End: 2021-04-10 | Stop reason: HOSPADM

## 2021-04-06 RX ORDER — ASPIRIN 81 MG/1
324 TABLET, CHEWABLE ORAL ONCE
Status: COMPLETED | OUTPATIENT
Start: 2021-04-06 | End: 2021-04-06

## 2021-04-06 RX ORDER — DEXAMETHASONE SODIUM PHOSPHATE 10 MG/ML
10 INJECTION, SOLUTION INTRAMUSCULAR; INTRAVENOUS ONCE
Status: COMPLETED | OUTPATIENT
Start: 2021-04-06 | End: 2021-04-06

## 2021-04-06 RX ORDER — ONDANSETRON 2 MG/ML
4 INJECTION INTRAMUSCULAR; INTRAVENOUS EVERY 6 HOURS PRN
Status: DISCONTINUED | OUTPATIENT
Start: 2021-04-06 | End: 2021-04-10 | Stop reason: HOSPADM

## 2021-04-06 RX ORDER — DEXAMETHASONE 4 MG/1
6 TABLET ORAL DAILY
Status: DISCONTINUED | OUTPATIENT
Start: 2021-04-07 | End: 2021-04-10 | Stop reason: HOSPADM

## 2021-04-06 RX ORDER — POTASSIUM CHLORIDE 20 MEQ/1
40 TABLET, EXTENDED RELEASE ORAL PRN
Status: DISCONTINUED | OUTPATIENT
Start: 2021-04-06 | End: 2021-04-10 | Stop reason: HOSPADM

## 2021-04-06 RX ORDER — ACETAMINOPHEN 325 MG/1
650 TABLET ORAL EVERY 6 HOURS PRN
Status: DISCONTINUED | OUTPATIENT
Start: 2021-04-06 | End: 2021-04-10 | Stop reason: HOSPADM

## 2021-04-06 RX ORDER — SODIUM CHLORIDE 0.9 % (FLUSH) 0.9 %
5-40 SYRINGE (ML) INJECTION PRN
Status: DISCONTINUED | OUTPATIENT
Start: 2021-04-06 | End: 2021-04-10 | Stop reason: HOSPADM

## 2021-04-06 RX ORDER — PROMETHAZINE HYDROCHLORIDE 25 MG/1
12.5 TABLET ORAL EVERY 6 HOURS PRN
Status: DISCONTINUED | OUTPATIENT
Start: 2021-04-06 | End: 2021-04-10 | Stop reason: HOSPADM

## 2021-04-06 RX ORDER — ACETAMINOPHEN 650 MG/1
650 SUPPOSITORY RECTAL EVERY 6 HOURS PRN
Status: DISCONTINUED | OUTPATIENT
Start: 2021-04-06 | End: 2021-04-10 | Stop reason: HOSPADM

## 2021-04-06 RX ADMIN — DEXAMETHASONE SODIUM PHOSPHATE 10 MG: 10 INJECTION, SOLUTION INTRAMUSCULAR; INTRAVENOUS at 16:16

## 2021-04-06 RX ADMIN — SODIUM CHLORIDE, PRESERVATIVE FREE 10 ML: 5 INJECTION INTRAVENOUS at 23:00

## 2021-04-06 RX ADMIN — ASPIRIN 324 MG: 81 TABLET, CHEWABLE ORAL at 17:06

## 2021-04-06 NOTE — H&P
[facility]       History and Physical      Name:  Jaja Servin /Age/Sex: 1930  (80 y.o. male)   MRN & CSN:  [id number] & [id number] Admission Date/Time: 2021  1:52 PM   Location:  ED[facility] PCP: Aye Francis MD       Hospital Day: 1    Assessment and Plan:   Jaja Servin is a 80 y.o.  male with past medical history of CLL who presents with shortness of breath    · COVID 19 infection-presented with shortness of breath, symptoms started 1 day before admission, patient had COVID-19 vaccination beginning of this year, had contact with Covid 19 infected family member at home -grandson  WBC- 15.5- chest xray unremarkable, SARS-CoV-2 detected on  -both rapid and PCR, maintain appropriate PPE and droplet plus isolation, monitor appropriate biomarkers, patient consented for convalescent plasma, started on dexamethasone and possible on Tocilizumab, no suspicion for superimposed bacterial pneumonia,  - monitor blood and sputum cultures, wean off oxygen as able, may consult pulmonary and ID services if no improvement    · Acute respiratory failure with hypoxia -continue with oxygen supplement, steroids, may consult pulmonary if no improvement in the next 24 hours    · 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 -likely prerenal, CR baseline 1.5, admission CR 1.7, avoid nephrotoxins, monitor BMP    · CLL -consult oncology    Diet No diet orders on file   DVT Prophylaxis [] Lovenox, []  Heparin, [] SCDs, [] No VTE prophylaxis, patient ambulating   GI Prophylaxis [] PPI, [] H2 Blocker, [] No GI prophylaxis, patient is receiving diet/Tube Feeds   Code Status No Order   Disposition Patient requires continued admission due to COVID-19    MDM [] Low, [] Moderate,[x]  High  Patient's risk as above due to COVID-19      History of Present Illness:     Chief Complaint: Shortness of breath    Jaja Servin is a 80 y.o.  male  with past medical history of CLL who presents with shortness of breath.   Patient states that he has taken his Covid vaccination at the beginning of the year in January and did not have any complications or side effects. He did have a contact with his grandson who had recently tested positive for COVID-19. He was reportedly having low oxygen saturation since last night but he subjectively does not feel like he has shortness of breath or respiratory distress. Denies any fever. He has no nausea or vomiting. He has no headache. He has no chest pain. He does not have any phlegm or sputum production. No diarrhea. Ten point ROS reviewed negative, unless as noted above    Objective:   No intake or output data in the 24 hours ending 04/06/21 1837   Vitals:   Vitals:    04/06/21 1701   BP: 130/76   Pulse: 61   Resp: 26   Temp:    SpO2:      Physical Exam:    GEN Awake male, resting in bed in no apparent distress. Appears given age. EYES Pupils are equally round. No scleral erythema, discharge, or conjunctivitis. HENT Mucous membranes are moist.   NECK No apparent thyromegaly or masses. RESP Clear to auscultation, no wheezes, rales or rhonchi. Symmetric chest movement while on room air. CARDIO/VASC S1/S2 auscultated. Regular rate without appreciable murmurs, rubs, or gallops. Peripheral pulses equal bilaterally and palpable. No peripheral edema. GI Abdomen is soft without significant tenderness, masses, or guarding. Bowel sounds are normoactive. Rectal exam deferred.  Figueroa catheter is not present. HEME/LYMPH No petechiae or ecchymoses. MSK No gross joint deformities. Spontaneous movement of all extremities  SKIN Normal coloration, warm, dry. NEURO Cranial nerves appear grossly intact, normal speech, no lateralizing weakness. PSYCH Awake, alert, oriented x 4. Affect appropriate. Past Medical History:      Past Medical History:   Diagnosis Date    Cardiac murmur     DEANNA noted on exam 7/14/20--- monitoring for now, consider echo- PT DEFERS. I SUSPECT ASYMPTOMATIC AS.     CLL (chronic lymphocytic leukemia) ([facility] 2014    dx'd 2014, sees Dr Dwight Pagan    COPD (chronic obstructive pulmonary disease) ([facility] 2018    INCIDENTAL FINDING ON CHEST CT 2018- NEVER SMOKED AND NO SX, NO INHALERS.  Macular degeneration     R>L, gets injections, is to see Dr Dominga Guerin. PSHX:  has a past surgical history that includes Inguinal hernia repair. Allergies: No Known Allergies    FAM HX: family history includes ADHD in his father; Diabetes in his mother. Soc HX:   Social History     Socioeconomic History    Marital status:      Spouse name: None    Number of children: None    Years of education: None    Highest education level: None   Occupational History    Occupation: retired     Comment: general surgeon   Social Needs    Financial resource strain: None    Food insecurity     Worry: None     Inability: None    Transportation needs     Medical: None     Non-medical: None   Tobacco Use    Smoking status: Never Smoker    Smokeless tobacco: Never Used   Substance and Sexual Activity    Alcohol use: Never     Frequency: Never    Drug use: Never    Sexual activity: None   Lifestyle    Physical activity     Days per week: None     Minutes per session: None    Stress: None   Relationships    Social connections     Talks on phone: None     Gets together: None     Attends Nondenominational service: None     Active member of club or organization: None     Attends meetings of clubs or organizations: None     Relationship status: None    Intimate partner violence     Fear of current or ex partner: None     Emotionally abused: None     Physically abused: None     Forced sexual activity: None   Other Topics Concern    None   Social History Narrative    None       Medications:   Medications:    Infusions:   PRN Meds:   Home medications-   Prior to Admission medications    Medication Sig Start Date End Date Taking?  Authorizing Provider   ibrutinib (IMBRUVICA) 420 MG tablet Take 1 tablet by mouth daily 11/16/20   Shelton Hussein MD   Multiple Vitamins-Minerals (THERAPEUTIC MULTIVITAMIN-MINERALS) tablet Take 1 tablet by mouth daily    Historical Provider, MD Aleksandar Hensley certify that Contreras Dubon is expected to be hospitalized for greater than 2 midnights based on the above assessment and plan:     Current diagnosis and plan of management discussed with the patient at the time of admission in lay language     Code status was discussed with patient  - Full code     Pain Assessment - no reported pain     Electronically signed by Shawna Costello MD on 4/6/2021 at 6:37 PM

## 2021-04-06 NOTE — ED NOTES
Pt ambulated to bathroom,steady once assisted up. OntravelO2. Tolerated well. Back in bed denies needs.      Julia Flores RN  04/06/21 [id number]

## 2021-04-06 NOTE — ED PROVIDER NOTES
[place]      PCP: Tamanna Cheung MD    CHIEF COMPLAINT    Chief Complaint   Patient presents with    Fatigue    Shortness of Breath       This patient was not evaluated by the attending physician. I have independently evaluated this patient. HPI    Kim Ott is a 80 y.o. male who presents with son for increasing fatigue and shortness of breath. Patient denies any complaints. Son states patient has had cough since yesterday. Family members had recently tested positive for Covid. Patient had received both vaccinations for Covid. Son states he checked pulse ox this morning and was in the 80s and he brought him in for evaluation. Patient denies chest pain, abdominal pain, vomiting, diarrhea, fever, sore throat, ear pain. REVIEW OF SYSTEMS    Constitutional:  Denies fever  HENT:  Denies sore throat or ear pain   Cardiovascular:  No chest pain. Respiratory:  See HPI. GI:  Denies abdominal pain, vomiting, or diarrhea  :  Denies any urinary symptoms    Musculoskeletal:  Denies back pain  Skin:  Denies rash  Neurologic:  Denies headache, focal weakness or sensory changes   Lymphatic:  Denies swollen glands     All other review of systems are negative  See HPI and nursing notes for additional information       PAST MEDICAL & SURGICAL HISTORY    Past Medical History:   Diagnosis Date    Cardiac murmur     DEANNA noted on exam 7/14/20--- monitoring for now, consider echo- PT DEFERS. I SUSPECT ASYMPTOMATIC AS.  CLL (chronic lymphocytic leukemia) ([facility] 2014    dx'd 2014, sees Dr Ebenezer Willard    COPD (chronic obstructive pulmonary disease) ([facility] 2018    INCIDENTAL FINDING ON CHEST CT 2018- NEVER SMOKED AND NO SX, NO INHALERS.  Macular degeneration     R>L, gets injections, is to see Dr Daquan Edouard.      Past Surgical History:   Procedure Laterality Date    INGUINAL HERNIA REPAIR      remote, unsure which side       CURRENT MEDICATIONS    Current Outpatient Rx Medication Sig Dispense Refill    ibrutinib (IMBRUVICA) 420 MG tablet Take 1 tablet by mouth daily 30 tablet 5    Multiple Vitamins-Minerals (THERAPEUTIC MULTIVITAMIN-MINERALS) tablet Take 1 tablet by mouth daily         ALLERGIES    No Known Allergies    SOCIAL & FAMILY HISTORY    Social History     Socioeconomic History    Marital status:       Spouse name: None    Number of children: None    Years of education: None    Highest education level: None   Occupational History    Occupation: retired     Comment: general surgeon   Social Needs    Financial resource strain: None    Food insecurity     Worry: None     Inability: None    Transportation needs     Medical: None     Non-medical: None   Tobacco Use    Smoking status: Never Smoker    Smokeless tobacco: Never Used   Substance and Sexual Activity    Alcohol use: Never     Frequency: Never    Drug use: Never    Sexual activity: None   Lifestyle    Physical activity     Days per week: None     Minutes per session: None    Stress: None   Relationships    Social connections     Talks on phone: None     Gets together: None     Attends Jehovah's witness service: None     Active member of club or organization: None     Attends meetings of clubs or organizations: None     Relationship status: None    Intimate partner violence     Fear of current or ex partner: None     Emotionally abused: None     Physically abused: None     Forced sexual activity: None   Other Topics Concern    None   Social History Narrative    None     Family History   Problem Relation Age of Onset    Diabetes Mother     ADHD Father        PHYSICAL EXAM    VITAL SIGNS: /76   Pulse 61   Temp 98.9 °F (37.2 °C)   Resp 26   Ht 5' 11.5\" (1.816 m)   Wt 175 lb (79.4 kg)   SpO2 97%   BMI 24.07 kg/m²    Constitutional: Elderly male, ill-appearing  HENT:  Atraumatic, moist mucus membranes  Neck/Lymphatics: supple, no JVD, no swollen nodes  Respiratory: No increased work of breathing, decreased lung sounds bilaterally  Cardiovascular: Normal rate and rhythm, murmur noted  GI:  Soft, nontender, normal bowel sounds  Musculoskeletal:  No edema, no acute deformities  Integument:  Skin is warm and dry, no petechiae   Neurologic:  Alert & oriented, no slurred speech  Psych: Pleasant affect, no hallucinations      EKG      EKG Interpretation  Please see ED physician's note for EKG interpretation        LABS:  Results for orders placed or performed during the hospital encounter of 04/06/21   COVID-19, Rapid    Specimen: Nasopharyngeal   Result Value Ref Range    Source UNKNOWN     SARS-CoV-2, NAAT DETECTED (A) NOT DETECTED   CBC with Auto Diff   Result Value Ref Range    WBC 9.3 4.0 - 10.5 K/CU MM    RBC 4.95 4.6 - 6.2 M/CU MM    Hemoglobin 15.0 13.5 - 18.0 GM/DL    Hematocrit 45.7 42 - 52 %    MCV 92.3 78 - 100 FL    MCH 30.3 27 - 31 PG    MCHC 32.8 32.0 - 36.0 %    RDW 14.2 11.7 - 14.9 %    Platelets 862 332 - 597 K/CU MM    MPV 10.4 7.5 - 11.1 FL    Differential Type AUTOMATED DIFFERENTIAL     Segs Relative 54.6 36 - 66 %    Lymphocytes % 38.8 24 - 44 %    Monocytes % 6.3 (H) 0 - 4 %    Eosinophils % 0.0 0 - 3 %    Basophils % 0.0 0 - 1 %    Segs Absolute 5.1 K/CU MM    Lymphocytes Absolute 3.6 K/CU MM    Monocytes Absolute 0.6 K/CU MM    Eosinophils Absolute 0.0 K/CU MM    Basophils Absolute 0.0 K/CU MM    Nucleated RBC % 0.0 %    Total Nucleated RBC 0.0 K/CU MM    Total Immature Neutrophil 0.03 K/CU MM    Immature Neutrophil % 0.3 0 - 0.43 %   CMP   Result Value Ref Range    Sodium 140 135 - 145 MMOL/L    Potassium 4.4 3.5 - 5.1 MMOL/L    Chloride 107 99 - 110 mMol/L    CO2 24 21 - 32 MMOL/L    BUN 37 (H) 6 - 23 MG/DL    CREATININE 1.7 (H) 0.9 - 1.3 MG/DL    Glucose 121 (H) 70 - 99 MG/DL    Calcium 8.1 (L) 8.3 - 10.6 MG/DL    Albumin 3.0 (L) 3.4 - 5.0 GM/DL    Total Protein 5.2 (L) 6.4 - 8.2 GM/DL    Total Bilirubin 0.6 0.0 - 1.0 MG/DL    ALT 15 10 - 40 U/L    AST 33 15 - 37 IU/L Alkaline Phosphatase 72 40 - 129 IU/L    GFR Non- 38 (L) >60 mL/min/1.73m2    GFR  46 (L) >60 mL/min/1.73m2    Anion Gap 9 4 - 16   Troponin   Result Value Ref Range    Troponin T 0.014 (H) <0.01 NG/ML   Brain Natriuretic Peptide   Result Value Ref Range    Pro-.2 (H) <300 PG/ML   Lactic Acid, Plasma   Result Value Ref Range    Lactate 1.7 0.4 - 2.0 mMOL/L   Blood Gas, Venous   Result Value Ref Range    pH, Devante 7.38 7.32 - 7.42    pCO2, [place] 41 38 - 52 mmHG    pO2, [place] 83 (H) 28 - 48 mmHG    Base Excess 1 0 - 3.3    HCO3, Venous 24.3 19 - 25 MMOL/L    O2 Sat, [place] 92.9 (H) 50 - 70 %   D-dimer, Quantitative   Result Value Ref Range    D-Dimer, Quant 274 (H) <230 NG/mL(DDU)   EKG 12 Lead   Result Value Ref Range    Ventricular Rate 61 BPM    Atrial Rate 61 BPM    P-R Interval 176 ms    QRS Duration 104 ms    Q-T Interval 460 ms    QTc Calculation (Bazett) 463 ms    P Axis 42 degrees    R Axis -1 degrees    T Axis 47 degrees    Diagnosis       Normal sinus rhythm  Incomplete right bundle branch block  ST & T wave abnormality, consider lateral ischemia  Prolonged QT  Abnormal ECG  No previous ECGs available             RADIOLOGY/PROCEDURES    XR CHEST PORTABLE   Final Result   Mild left ventricular enlargement without interstitial edema. Discoid atelectasis and/or scarring in both lower lobes. ED COURSE & MEDICAL DECISION MAKING      Patient presents as above. Patient's pulse ox is in the 80s on room air and is placed on nasal cannula oxygen and improves into the mid 90s. See physician note for EKG reading. Patient has had recent family members who tested positive for Covid. Patient has had Covid vaccine. CBC within normal limits. CMP shows BUN of 37, creatinine 1.7. Troponin is mildly elevated at 0.014, patient provided aspirin. BNP is 862. D-dimer is 274. Rapid Covid is positive.   Patient son is a physician and is requesting a PCR for confirmation of Covid. Patient provided Decadron due to hypoxia with Covid. Chest x-ray shows mild left ventricular enlargement without interstitial edema, discoid atelectasis and/or scarring of both lower lobes. Consult hospitalist who accepts admission. CRITICAL CARE NOTE:  There was a high probability of clinically significant life-threatening deterioration of the patient's condition requiring my urgent intervention due to Covid, hypoxia. Oxygen, Decadron was performed to address this. Total critical care time is at least  35 minutes. This includes vital sign monitoring, pulse oximetry monitoring, telemetry monitoring, clinical response to the IV medications, reviewing the nursing notes, consultation time, dictation/documentation time, and interpretation of the lab work. This time excludes time spent performing procedures and separately billable procedures and family discussion time. Clinical  IMPRESSION    1. COVID-19    2. Fatigue, unspecified type    3. Hypoxia    4. Elevated troponin          Patient admitted    Comment: Please note this report has been produced using speech recognition software and may contain errors related to that system including errors in grammar, punctuation, and spelling, as well as words and phrases that may be inappropriate. If there are any questions or concerns please feel free to contact the dictating provider for clarification.                             Avery Munoz PA-C  04/06/21 [id number]

## 2021-04-06 NOTE — ED PROVIDER NOTES
12 lead EKG per my interpretation:  Normal Sinus Rhythm 61  Axis is   Normal  QTc is  463  There is specific T wave changes appreciated. Inverted T wave V5-V6  There is no specific ST wave changes appreciated.     Prior EKG to compare with was not available         Zion Mullins DO  04/06/21 0289

## 2021-04-07 LAB
ALBUMIN SERPL-MCNC: 3.8 GM/DL (ref 3.4–5)
ALP BLD-CCNC: 83 IU/L (ref 40–128)
ALT SERPL-CCNC: 16 U/L (ref 10–40)
ANION GAP SERPL CALCULATED.3IONS-SCNC: 12 MMOL/L (ref 4–16)
AST SERPL-CCNC: 34 IU/L (ref 15–37)
BASOPHILS ABSOLUTE: 0 K/CU MM
BASOPHILS RELATIVE PERCENT: 0.1 % (ref 0–1)
BILIRUB SERPL-MCNC: 0.4 MG/DL (ref 0–1)
BUN BLDV-MCNC: 38 MG/DL (ref 6–23)
CALCIUM SERPL-MCNC: 8.3 MG/DL (ref 8.3–10.6)
CHLORIDE BLD-SCNC: 108 MMOL/L (ref 99–110)
CO2: 24 MMOL/L (ref 21–32)
CREAT SERPL-MCNC: 1.5 MG/DL (ref 0.9–1.3)
D DIMER: 332 NG/ML(DDU)
DIFFERENTIAL TYPE: ABNORMAL
EOSINOPHILS ABSOLUTE: 0 K/CU MM
EOSINOPHILS RELATIVE PERCENT: 0 % (ref 0–3)
FIBRINOGEN LEVEL: 422 MG/DL (ref 196.9–442.1)
GFR AFRICAN AMERICAN: 53 ML/MIN/1.73M2
GFR NON-AFRICAN AMERICAN: 44 ML/MIN/1.73M2
GLUCOSE BLD-MCNC: 193 MG/DL (ref 70–99)
HCT VFR BLD CALC: 52.1 % (ref 42–52)
HEMOGLOBIN: 16.3 GM/DL (ref 13.5–18)
HIGH SENSITIVE C-REACTIVE PROTEIN: 50 MG/L
IMMATURE NEUTROPHIL %: 0.3 % (ref 0–0.43)
LACTATE DEHYDROGENASE: 419 IU/L (ref 120–246)
LYMPHOCYTES ABSOLUTE: 4 K/CU MM
LYMPHOCYTES RELATIVE PERCENT: 60.1 % (ref 24–44)
MCH RBC QN AUTO: 29.4 PG (ref 27–31)
MCHC RBC AUTO-ENTMCNC: 31.3 % (ref 32–36)
MCV RBC AUTO: 93.9 FL (ref 78–100)
MONOCYTES ABSOLUTE: 0.1 K/CU MM
MONOCYTES RELATIVE PERCENT: 1.2 % (ref 0–4)
NUCLEATED RBC %: 0 %
PDW BLD-RTO: 14.1 % (ref 11.7–14.9)
PLATELET # BLD: 174 K/CU MM (ref 140–440)
PMV BLD AUTO: 10.9 FL (ref 7.5–11.1)
POTASSIUM SERPL-SCNC: 4.5 MMOL/L (ref 3.5–5.1)
PROCALCITONIN: 0.09
RBC # BLD: 5.55 M/CU MM (ref 4.6–6.2)
SEGMENTED NEUTROPHILS ABSOLUTE COUNT: 2.6 K/CU MM
SEGMENTED NEUTROPHILS RELATIVE PERCENT: 38.3 % (ref 36–66)
SODIUM BLD-SCNC: 144 MMOL/L (ref 135–145)
TOTAL IMMATURE NEUTOROPHIL: 0.02 K/CU MM
TOTAL NUCLEATED RBC: 0 K/CU MM
TOTAL PROTEIN: 5.8 GM/DL (ref 6.4–8.2)
TROPONIN T: <0.01 NG/ML
WBC # BLD: 6.7 K/CU MM (ref 4–10.5)

## 2021-04-07 PROCEDURE — 6360000002 HC RX W HCPCS: Performed by: INTERNAL MEDICINE

## 2021-04-07 PROCEDURE — 84484 ASSAY OF TROPONIN QUANT: CPT

## 2021-04-07 PROCEDURE — 85025 COMPLETE CBC W/AUTO DIFF WBC: CPT

## 2021-04-07 PROCEDURE — 80053 COMPREHEN METABOLIC PANEL: CPT

## 2021-04-07 PROCEDURE — XW033E5 INTRODUCTION OF REMDESIVIR ANTI-INFECTIVE INTO PERIPHERAL VEIN, PERCUTANEOUS APPROACH, NEW TECHNOLOGY GROUP 5: ICD-10-PCS | Performed by: INTERNAL MEDICINE

## 2021-04-07 PROCEDURE — 2500000003 HC RX 250 WO HCPCS: Performed by: INTERNAL MEDICINE

## 2021-04-07 PROCEDURE — 6370000000 HC RX 637 (ALT 250 FOR IP): Performed by: INTERNAL MEDICINE

## 2021-04-07 PROCEDURE — 85379 FIBRIN DEGRADATION QUANT: CPT

## 2021-04-07 PROCEDURE — 93010 ELECTROCARDIOGRAM REPORT: CPT | Performed by: INTERNAL MEDICINE

## 2021-04-07 PROCEDURE — 2580000003 HC RX 258: Performed by: INTERNAL MEDICINE

## 2021-04-07 PROCEDURE — 1200000000 HC SEMI PRIVATE

## 2021-04-07 PROCEDURE — 86141 C-REACTIVE PROTEIN HS: CPT

## 2021-04-07 PROCEDURE — 84145 PROCALCITONIN (PCT): CPT

## 2021-04-07 PROCEDURE — 85384 FIBRINOGEN ACTIVITY: CPT

## 2021-04-07 PROCEDURE — 83615 LACTATE (LD) (LDH) ENZYME: CPT

## 2021-04-07 RX ORDER — 0.9 % SODIUM CHLORIDE 0.9 %
30 INTRAVENOUS SOLUTION INTRAVENOUS PRN
Status: DISCONTINUED | OUTPATIENT
Start: 2021-04-07 | End: 2021-04-10 | Stop reason: HOSPADM

## 2021-04-07 RX ORDER — AMLODIPINE BESYLATE 5 MG/1
5 TABLET ORAL DAILY
Status: DISCONTINUED | OUTPATIENT
Start: 2021-04-07 | End: 2021-04-10 | Stop reason: HOSPADM

## 2021-04-07 RX ORDER — FAMOTIDINE 20 MG/1
20 TABLET, FILM COATED ORAL 2 TIMES DAILY
Status: DISCONTINUED | OUTPATIENT
Start: 2021-04-07 | End: 2021-04-10 | Stop reason: HOSPADM

## 2021-04-07 RX ADMIN — REMDESIVIR 200 MG: 100 INJECTION, POWDER, LYOPHILIZED, FOR SOLUTION INTRAVENOUS at 17:20

## 2021-04-07 RX ADMIN — ENOXAPARIN SODIUM 40 MG: 40 INJECTION SUBCUTANEOUS at 03:09

## 2021-04-07 RX ADMIN — PROMETHAZINE HYDROCHLORIDE 12.5 MG: 25 TABLET ORAL at 20:19

## 2021-04-07 RX ADMIN — AMLODIPINE BESYLATE 5 MG: 5 TABLET ORAL at 15:32

## 2021-04-07 RX ADMIN — DEXAMETHASONE 6 MG: 4 TABLET ORAL at 09:00

## 2021-04-07 RX ADMIN — Medication 2000 UNITS: at 09:01

## 2021-04-07 RX ADMIN — FAMOTIDINE 20 MG: 20 TABLET ORAL at 20:19

## 2021-04-07 RX ADMIN — ENOXAPARIN SODIUM 40 MG: 40 INJECTION SUBCUTANEOUS at 20:19

## 2021-04-07 RX ADMIN — SODIUM CHLORIDE, PRESERVATIVE FREE 10 ML: 5 INJECTION INTRAVENOUS at 20:18

## 2021-04-07 RX ADMIN — TOCILIZUMAB 600 MG: 20 INJECTION, SOLUTION, CONCENTRATE INTRAVENOUS at 00:11

## 2021-04-07 NOTE — PROGRESS NOTES
Pt having periods of confusion where he takes tele, gown and IV out and says hes ready to go. Reorients quickly x4 after being reminded.

## 2021-04-07 NOTE — PROGRESS NOTES
pts sebastianet given to Kirit Dove, case management, who gave it to pts son, Randalyn Paget. Pt agreed and updated.

## 2021-04-07 NOTE — CONSULTS
Pharmacy Consult for Tocilizumab Initiation per Dr. Casi Rogers per [facility] P&T Committee  **All criteria need to be met to receive   Tocilizumab for COVID-19 patients**   Age    >22 years old   Yes   Ordering Provider    Restricted to ID, intensivists, or pulmonology   No, but consult possible   Laboratory Results    Confirmed positive COVID-19  CRP >75 mg/L   Pending   Clinical Status     Within 7 days of symptom onset (< 7 days) OR   Within 2 days of hospital admission     If requiring initiation of vital organ support, must start tocilizumab within 24 hours of initiation*     Yes   Oxygen Status     <92% OR requiring supplemental oxygen    Yes     Contraindications    1. Invasive active mycobacterial or fungal infection  2. Platelet <300,807 or active bleeding  3. Not receiving systemic steroids   4. Significant immunosuppression    ?     None     Dosing Recommendations:  (One dose maximum)    800 mg: Patient weight >90 kg x 1 dose   600 mg: Patient weight >65 kg to ?90 kg x 1 dose   400 mg: Patient weight >40 kg to ?65 kg x 1 dose   8 mg/kg: Patient weight ?40 kg x 1 dose     Thank you for the consult,  Viviane Moreno, PharmD, BCPS  4/6/2021   8:59 PM

## 2021-04-07 NOTE — CARE COORDINATION
CM called pt to initiate a safe discharge plan. Cm  introduced self and explained role of CM. Pt is kind, alert and oriented. Pt lives alone in a one floor home. Pt stated that he uses no DME. Pt has a grandson Maged Zhang across the street that provides for transportation, obtaining groceries. Pt has a PCP. Pt has insurance and is able to afford her prescriptions/ meds. CM offered home care and pt declined. CM received permission to call cherie Zhang. CM called Maged Zhang and number on contact list is incorrect. CM called Loretta Bender grd son and left a VM. Discharge plan at this time is home alone with family being supportive. CM offered and declined home care. CM provided card and encouraged to call for any needs or concern. CM is available if any needs arise. 1030 CM received call from pts son. CM sent a PS to Dr Chantelle Crowell to call him. LH  1150 CM met with pts son outside of covid unit. Son was given pts wallet per nursing and CM handed to son. Son spoke with Dr Chantelle Crowell. Cm will continue to follow for discharge planning. Pt/ot will need to see. Pt lives alone.  [address]

## 2021-04-07 NOTE — PROGRESS NOTES
Hospitalist Progress Note      Name:  Richie Naranjo /Age/Sex: 1930  (80 y.o. male)   MRN & CSN:  [id number] & [id number] Admission Date/Time: 2021  1:52 PM   Location:  -A PCP: Adarsh Castellano MD         Hospital Day: 2    Assessment and Plan:   Richie Naranjo is a 80 y.o.  male  who presents with COVID-19    · Acute Hypoxic Respiratory Failure:   · due to COVID-19 pneumonia  · Requiring O2 via nasal cannula, saturation 96% at 3 L. · CXR mild left ventricular enlargement without interstitial edema. · Inflammatory markers evaded LD, slightly elevated CRP, slightly elevated D-dimer. · Given Tocilizumab  · Remdesivir not given because of acute kidney injury, creatinine has improved. Will give for 5 days. · Started on dexamethasone  · DVT prophylaxis with Lovenox. · Pulmonology on consult. 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 CLL  · Continue Ibrutinib. · Oncology on consult    Acute metabolic encephalopathy  · Could be from delirium, COVID-19  · Sitter at the bedside. Son Dr. Fidel Cordon agreed on remdesivir. Diet DIET GENERAL;   DVT Prophylaxis [x] Lovenox, []  Heparin, [] SCDs, [] Warfarin  [] NOAC     GI Prophylaxis [] PPI,  [] H2 Blocker,  [] Carafate,  [] Diet/Tube Feeds   Code Status DNR-CCA   MDM [] Low, [] Moderate,[x]  High     History of Present Illness:     Chief Complaint: HFBLC-05    Seen and examined today. Awake but confused. Not able to tell me details of history. Still with cough. But denies shortness of breath. On oxygen by nasal cannula 3 L. Ten point ROS reviewed negative, unless as noted above    Objective:        Intake/Output Summary (Last 24 hours) at 2021 1541  Last data filed at 2021 2300  Gross per 24 hour   Intake 10 ml   Output --   Net 10 ml      Vitals:   Vitals:    21 1050   BP:    Pulse: 58   Resp: Temp:    SpO2:      Physical Exam:   GEN Awake male, sitting upright in bed in no apparent distress. Appears given age. On oxygen via nasal cannula 3 L. Awake but confused  EYES Pupils are equally round. No scleral erythema, discharge, or conjunctivitis. HENT Mucous membranes are moist. Oral pharynx without exudates, no evidence of thrush. NECK Supple, no apparent thyromegaly or masses. RESP Clear to auscultation, no wheezes, rales or rhonchi. Symmetric chest movement while on room air. CARDIO/VASC S1/S2 auscultated. Regular rate without appreciable murmurs, rubs, or gallops. No JVD or carotid bruits. Peripheral pulses equal bilaterally and palpable. No peripheral edema. GI Abdomen is soft without significant tenderness, masses, or guarding. Bowel sounds are normoactive. Rectal exam deferred. MSK No gross joint deformities.     Medications:   Medications:    ibrutinib  420 mg Oral Daily    amLODIPine  5 mg Oral Daily    sodium chloride flush  5-40 mL Intravenous 2 times per day    enoxaparin  40 mg Subcutaneous Nightly    dexamethasone  6 mg Oral Daily    Vitamin D  2,000 Units Oral Daily      Infusions:    sodium chloride       PRN Meds: sodium chloride flush, 5-40 mL, PRN  sodium chloride, 25 mL, PRN  promethazine, 12.5 mg, Q6H PRN    Or  ondansetron, 4 mg, Q6H PRN  polyethylene glycol, 17 g, Daily PRN  acetaminophen, 650 mg, Q6H PRN    Or  acetaminophen, 650 mg, Q6H PRN  potassium chloride, 40 mEq, PRN    Or  potassium alternative oral replacement, 40 mEq, PRN    Or  potassium chloride, 10 mEq, PRN        Recent Labs     04/06/21  1422 04/07/21  0930   WBC 9.3 6.7   HGB 15.0 16.3   HCT 45.7 52.1*    174      Recent Labs     04/06/21  1422 04/07/21  0930    144   K 4.4 4.5    108   CO2 24 24   BUN 37* 38*   CREATININE 1.7* 1.5*     Recent Labs     04/06/21  1422 04/07/21  0930   AST 33 34   ALT 15 16   BILITOT 0.6 0.4   ALKPHOS 72 83     No results for input(s): INR in the last 72 hours.   Recent Labs     04/06/21  1422 04/07/21  0930   TROPONINT 0.014* <0.010        Imaging reviewed      Electronically signed by Suleiman Kern MD on 4/7/2021 at 3:41 PM

## 2021-04-07 NOTE — PROGRESS NOTES
RRT checked on pt. saO2 97% on 4.5 lpm with the N/C  Pt has the nasal cannula in his mouth. He must be a mouth breather.

## 2021-04-07 NOTE — PROGRESS NOTES
Pt removed gown and monitor, pt was found naked in bathroom, alarm was in place-on pt gown while pt is sitting in chair, pt is alert and oriented at times with periods of confusion, pt is danger to self and may fall, pt refuses to sit in bed, sat pt back in chair, elevated foot rest, placed tray table over pt legs, gave pt remote, monitor is ineffective watching pt when nurses are in other pt rooms oriented pt to place and time and events, messaging dr. Irais Drew for sitter order.

## 2021-04-08 LAB
ALBUMIN SERPL-MCNC: 3.4 GM/DL (ref 3.4–5)
ALBUMIN SERPL-MCNC: 3.4 GM/DL (ref 3.4–5)
ALP BLD-CCNC: 72 IU/L (ref 40–128)
ALP BLD-CCNC: 72 IU/L (ref 40–129)
ALT SERPL-CCNC: 15 U/L (ref 10–40)
ALT SERPL-CCNC: 15 U/L (ref 10–40)
ANION GAP SERPL CALCULATED.3IONS-SCNC: 9 MMOL/L (ref 4–16)
AST SERPL-CCNC: 28 IU/L (ref 15–37)
AST SERPL-CCNC: 28 IU/L (ref 15–37)
BILIRUB SERPL-MCNC: 0.3 MG/DL (ref 0–1)
BILIRUB SERPL-MCNC: 0.3 MG/DL (ref 0–1)
BILIRUBIN DIRECT: 0.2 MG/DL (ref 0–0.3)
BILIRUBIN, INDIRECT: 0.1 MG/DL (ref 0–0.7)
BUN BLDV-MCNC: 39 MG/DL (ref 6–23)
CALCIUM SERPL-MCNC: 8.2 MG/DL (ref 8.3–10.6)
CHLORIDE BLD-SCNC: 108 MMOL/L (ref 99–110)
CO2: 25 MMOL/L (ref 21–32)
CREAT SERPL-MCNC: 1.3 MG/DL (ref 0.9–1.3)
D DIMER: 222 NG/ML(DDU)
GFR AFRICAN AMERICAN: >60 ML/MIN/1.73M2
GFR NON-AFRICAN AMERICAN: 52 ML/MIN/1.73M2
GLUCOSE BLD-MCNC: 137 MG/DL (ref 70–99)
HCT VFR BLD CALC: 50.1 % (ref 42–52)
HEMOGLOBIN: 15.6 GM/DL (ref 13.5–18)
HIGH SENSITIVE C-REACTIVE PROTEIN: 17.7 MG/L
LV EF: 45 %
LVEF MODALITY: NORMAL
MCH RBC QN AUTO: 29.2 PG (ref 27–31)
MCHC RBC AUTO-ENTMCNC: 31.1 % (ref 32–36)
MCV RBC AUTO: 93.8 FL (ref 78–100)
PDW BLD-RTO: 13.8 % (ref 11.7–14.9)
PLATELET # BLD: 208 K/CU MM (ref 140–440)
PMV BLD AUTO: 11 FL (ref 7.5–11.1)
POTASSIUM SERPL-SCNC: 4.7 MMOL/L (ref 3.5–5.1)
PROCALCITONIN: 0.06
RBC # BLD: 5.34 M/CU MM (ref 4.6–6.2)
SODIUM BLD-SCNC: 142 MMOL/L (ref 135–145)
TOTAL PROTEIN: 5 GM/DL (ref 6.4–8.2)
TOTAL PROTEIN: 5 GM/DL (ref 6.4–8.2)
TSH HIGH SENSITIVITY: 1.15 UIU/ML (ref 0.27–4.2)
WBC # BLD: 11.3 K/CU MM (ref 4–10.5)

## 2021-04-08 PROCEDURE — 99221 1ST HOSP IP/OBS SF/LOW 40: CPT | Performed by: INTERNAL MEDICINE

## 2021-04-08 PROCEDURE — 6370000000 HC RX 637 (ALT 250 FOR IP): Performed by: INTERNAL MEDICINE

## 2021-04-08 PROCEDURE — 85027 COMPLETE CBC AUTOMATED: CPT

## 2021-04-08 PROCEDURE — 82248 BILIRUBIN DIRECT: CPT

## 2021-04-08 PROCEDURE — 2700000000 HC OXYGEN THERAPY PER DAY

## 2021-04-08 PROCEDURE — 2500000003 HC RX 250 WO HCPCS: Performed by: INTERNAL MEDICINE

## 2021-04-08 PROCEDURE — 83735 ASSAY OF MAGNESIUM: CPT

## 2021-04-08 PROCEDURE — 85379 FIBRIN DEGRADATION QUANT: CPT

## 2021-04-08 PROCEDURE — 1200000000 HC SEMI PRIVATE

## 2021-04-08 PROCEDURE — 84443 ASSAY THYROID STIM HORMONE: CPT

## 2021-04-08 PROCEDURE — 2580000003 HC RX 258: Performed by: INTERNAL MEDICINE

## 2021-04-08 PROCEDURE — 93306 TTE W/DOPPLER COMPLETE: CPT

## 2021-04-08 PROCEDURE — 80053 COMPREHEN METABOLIC PANEL: CPT

## 2021-04-08 PROCEDURE — 97162 PT EVAL MOD COMPLEX 30 MIN: CPT

## 2021-04-08 PROCEDURE — 94761 N-INVAS EAR/PLS OXIMETRY MLT: CPT

## 2021-04-08 PROCEDURE — 80048 BASIC METABOLIC PNL TOTAL CA: CPT

## 2021-04-08 PROCEDURE — 6360000002 HC RX W HCPCS: Performed by: INTERNAL MEDICINE

## 2021-04-08 PROCEDURE — 84145 PROCALCITONIN (PCT): CPT

## 2021-04-08 PROCEDURE — 93005 ELECTROCARDIOGRAM TRACING: CPT | Performed by: INTERNAL MEDICINE

## 2021-04-08 PROCEDURE — 86141 C-REACTIVE PROTEIN HS: CPT

## 2021-04-08 PROCEDURE — 94640 AIRWAY INHALATION TREATMENT: CPT

## 2021-04-08 PROCEDURE — 97165 OT EVAL LOW COMPLEX 30 MIN: CPT

## 2021-04-08 RX ORDER — ALBUTEROL SULFATE 90 UG/1
2 AEROSOL, METERED RESPIRATORY (INHALATION) 4 TIMES DAILY
Status: DISCONTINUED | OUTPATIENT
Start: 2021-04-08 | End: 2021-04-10 | Stop reason: HOSPADM

## 2021-04-08 RX ADMIN — REMDESIVIR 100 MG: 100 INJECTION, POWDER, LYOPHILIZED, FOR SOLUTION INTRAVENOUS at 16:53

## 2021-04-08 RX ADMIN — Medication 2000 UNITS: at 08:25

## 2021-04-08 RX ADMIN — DEXAMETHASONE 6 MG: 4 TABLET ORAL at 08:25

## 2021-04-08 RX ADMIN — FAMOTIDINE 20 MG: 20 TABLET ORAL at 20:35

## 2021-04-08 RX ADMIN — SODIUM CHLORIDE, PRESERVATIVE FREE 10 ML: 5 INJECTION INTRAVENOUS at 08:25

## 2021-04-08 RX ADMIN — ALBUTEROL SULFATE 2 PUFF: 90 AEROSOL, METERED RESPIRATORY (INHALATION) at 16:07

## 2021-04-08 RX ADMIN — RIVAROXABAN 15 MG: 15 TABLET, FILM COATED ORAL at 16:53

## 2021-04-08 RX ADMIN — SODIUM CHLORIDE, PRESERVATIVE FREE 10 ML: 5 INJECTION INTRAVENOUS at 20:35

## 2021-04-08 RX ADMIN — FAMOTIDINE 20 MG: 20 TABLET ORAL at 08:25

## 2021-04-08 RX ADMIN — AMLODIPINE BESYLATE 5 MG: 5 TABLET ORAL at 08:25

## 2021-04-08 NOTE — CONSULTS
CARDIOLOGY CONSULT NOTE   Reason for consultation:  afib    Referring physician: Dr. Luis Horne    Primary care physician: Hector Rodriguez MD      Dear Dr. Luis Horne  Thanks for the consult. History of present illness:Rene is a 80 y. o.year old who  presents with for shortness of breath which is mild,and had hypoxia and admitted to ICU, he has shortness of breath for few days, intermittent, self limiting, not associated with cough or fever, gets worse with activity and better with rest.  Cardiology consult called for afib, patient does not history of afib  Blood pressure, cholesterol, blood glucose and weight are well controlled. Past medical history:    has a past medical history of Cardiac murmur, CLL (chronic lymphocytic leukemia) ([facility] [address] COPD (chronic obstructive pulmonary disease) ([facility] [address] and Macular degeneration. Past surgical history:   has a past surgical history that includes Inguinal hernia repair. Social History:   reports that he has never smoked. He has never used smokeless tobacco. He reports that he does not drink alcohol or use drugs.   Family history:   no family history of CAD, STROKE of DM    No Known Allergies    albuterol sulfate  (90 Base) MCG/ACT inhaler 2 puff, 4x daily  ibrutinib (IMBRUVICA) chemo tablet 420 mg, Daily  amLODIPine (NORVASC) tablet 5 mg, Daily  famotidine (PEPCID) tablet 20 mg, BID  remdesivir 100 mg in sodium chloride 0.9 % 250 mL IVPB, Q24H  0.9 % sodium chloride bolus, PRN  sodium chloride flush 0.9 % injection 5-40 mL, 2 times per day  sodium chloride flush 0.9 % injection 5-40 mL, PRN  0.9 % sodium chloride infusion, PRN  enoxaparin (LOVENOX) injection 40 mg, Nightly  promethazine (PHENERGAN) tablet 12.5 mg, Q6H PRN    Or  ondansetron (ZOFRAN) injection 4 mg, Q6H PRN  polyethylene glycol (GLYCOLAX) packet 17 g, Daily PRN  acetaminophen (TYLENOL) tablet 650 mg, Q6H PRN    Or  acetaminophen (TYLENOL) suppository 650 mg, Q6H PRN  potassium chloride (KLOR-CON M) extended release tablet 40 mEq, PRN    Or  potassium bicarb-citric acid (EFFER-K) effervescent tablet 40 mEq, PRN    Or  potassium chloride 10 mEq/100 mL IVPB (Peripheral Line), PRN  dexamethasone (DECADRON) tablet 6 mg, Daily  Vitamin D (CHOLECALCIFEROL) tablet 2,000 Units, Daily      Current Facility-Administered Medications   Medication Dose Route Frequency Provider Last Rate Last Admin    albuterol sulfate  (90 Base) MCG/ACT inhaler 2 puff  2 puff Inhalation 4x daily Vickie Sin MD        ibrutinib (IMBRUVICA) chemo tablet 420 mg  420 mg Oral Daily Nicole Delgado MD        amLODIPine (NORVASC) tablet 5 mg  5 mg Oral Daily Nicole Delgado MD   5 mg at 04/08/21 0825    famotidine (PEPCID) tablet 20 mg  20 mg Oral BID Nicole Delgado MD   20 mg at 04/08/21 0825    remdesivir 100 mg in sodium chloride 0.9 % 250 mL IVPB  100 mg Intravenous Q24H Nicole Delgado MD        0.9 % sodium chloride bolus  30 mL Intravenous PRN Nicole Delgado MD        sodium chloride flush 0.9 % injection 5-40 mL  5-40 mL Intravenous 2 times per day Meliton Montero MD   10 mL at 04/08/21 0825    sodium chloride flush 0.9 % injection 5-40 mL  5-40 mL Intravenous PRN Meliton Montero MD        0.9 % sodium chloride infusion  25 mL Intravenous PRN Meliton Montero MD        enoxaparin (LOVENOX) injection 40 mg  40 mg Subcutaneous Nightly Neeraj Price MD   40 mg at 04/07/21 2019    promethazine (PHENERGAN) tablet 12.5 mg  12.5 mg Oral Q6H PRN Meliton Montero MD   12.5 mg at 04/07/21 2019    Or    ondansetron (ZOFRAN) injection 4 mg  4 mg Intravenous Q6H PRN Meliton Montero MD        polyethylene glycol (GLYCOLAX) packet 17 g  17 g Oral Daily PRN Meliton Montero MD        acetaminophen (TYLENOL) tablet 650 mg  650 mg Oral Q6H PRN Meliton Montero MD        Or    acetaminophen (TYLENOL) suppository 650 mg  650 mg Rectal Q6H PRN Meliton Montero MD        potassium chloride (KLOR-CON M) extended release tablet 40 mEq  40 mEq Oral PRN Meliton Montero MD        Or    potassium bicarb-citric acid (EFFER-K) effervescent tablet 40 mEq  40 mEq Oral PRN Meliton Montero MD        Or   [facility] potassium chloride 10 mEq/100 mL IVPB (Peripheral Line)  10 mEq Intravenous PRN Meliton Montero MD        dexamethasone (DECADRON) tablet 6 mg  6 mg Oral Daily Meliton Montero MD   6 mg at 04/08/21 0825    Vitamin D (CHOLECALCIFEROL) tablet 2,000 Units  2,000 Units Oral Daily Meliton Montero MD   2,000 Units at 04/08/21 0825     Review of Systems:   · Constitutional: No Fever or Weight Loss   · Eyes: No Decreased Vision  · ENT: No Headaches, Hearing Loss or Vertigo  · Cardiovascular: No chest pain, dyspnea on exertion, palpitations or loss of consciousness  · Respiratory: No cough or wheezing    · Gastrointestinal: No abdominal pain, appetite loss, blood in stools, constipation, diarrhea or heartburn  · Genitourinary: No dysuria, trouble voiding, or hematuria  · Musculoskeletal:  No gait disturbance, weakness or joint complaints  · Integumentary: No rash or pruritis  · Neurological: No TIA or stroke symptoms  · Psychiatric: No anxiety or depression  · Endocrine: No malaise, fatigue or temperature intolerance  · Hematologic/Lymphatic: No bleeding problems, blood clots or swollen lymph nodes  · Allergic/Immunologic: No nasal congestion or hives  All systems negative except as marked. ·   ·      Physical Examination:    Vitals:    04/08/21 1158   BP: 116/68   Pulse: 86   Resp: 25   Temp: afebrile   SpO2: 95%      Wt Readings from Last 3 Encounters:   04/08/21 170 lb 3.1 oz (77.2 kg)   01/12/21 174 lb (78.9 kg)   10/20/20 173 lb (78.5 kg)     Body mass index is 23.41 kg/m². General Appearance:  No distress, conversant    Constitutional:  Well developed, Well nourished, No acute distress, Non-toxic appearance.    HENT:  Normocephalic, Atraumatic, Bilateral external ears normal, Oropharynx moist, No oral exudates, Nose normal. Neck- Normal range of motion, No tenderness, Supple, No stridor,no apical-carotid delay, no carotid bruit  Eyes:  PERRL, EOMI, Conjunctiva normal, No discharge. Respiratory:  Normal breath sounds, No respiratory distress, No wheezing, No chest tenderness. ,no use of accessory muscles, diaphragm movement is normal  Cardiovascular: (PMI) apex non displaced,no lifts no thrills, no s3,no s4, Normal heart rate, Normal rhythm, No murmurs, No rubs, No gallops. Carotid arteries pulse and amplitude are normal no bruit, no abdominal bruit noted ( normal abdominal aorta ausculation), femoral arteries pulse and amplitude are normal no bruit, pedal pulses are normal  GI:  Bowel sounds normal, Soft, No tenderness, No masses, No pulsatile masses, no hepatosplenomegally, no bruits  : External genitalia appear normal, No masses or lesions. No discharge. No CVA tenderness. Musculoskeletal:  Intact distal pulses, No edema, No tenderness, No cyanosis, No clubbing. Good range of motion in all major joints. No tenderness to palpation or major deformities noted. Back- No tenderness. Integument:  Warm, Dry, No erythema, No rash. Skin: no rash, no ulcers  Lymphatic:  No lymphadenopathy noted. Neurologic:  Alert & oriented x 3, Normal motor function, Normal sensory function, No focal deficits noted. Psychiatric:  Affect normal, Judgment normal, Mood normal.   Lab Review   Recent Labs     04/08/21  0600   WBC 11.3*   HGB 15.6   HCT 50.1         Recent Labs     04/08/21  0600      K 4.7      CO2 25   BUN 39*   CREATININE 1.3     Recent Labs     04/08/21  0600   AST 28  28   ALT 15  15   BILIDIR 0.2   BILITOT 0.3  0.3   ALKPHOS 72  72     No results for input(s): TROPONINI in the last 72 hours. No results found for: BNP  No results found for: INR, PROTIME      EKG:afib    Chest Xray:  Mild left ventricular enlargement without interstitial edema.       Discoid atelectasis and/or scarring in both lower lobes. [facility]  Labs, echo, meds reviewed  Assessment: 80 y. o.year old with PMH of  has a past medical history of Cardiac murmur, CLL (chronic lymphocytic leukemia) ([facility] [address] COPD (chronic obstructive pulmonary disease) ([facility] [address] and Macular degeneration. Recommendations:    1. COVID 19: admitted to hospital, continue care as per medicine  2. Afib; Most likely chronic, will get echo, CHADs vasc score is 2 add xatelto if no contraindication  3. HTN;' stable, continue norvasc  4. COPD: continue oxygen and nebulizer  5. Health maintenance: exerise and diet  All labs, medications and tests reviewed, continue all other medications of all above medical condition listed as is.          Mike Fleming MD, 4/8/2021 12:31 PM

## 2021-04-08 NOTE — CONSULTS
[address] [place] [address], [address] [facility]                                  CONSULTATION    PATIENT NAME: Emani Maxwell                  :        1930  MED REC NO:   [id number]                          ROOM:       2019  ACCOUNT NO:   [de-identified]                           ADMIT DATE: 2021  PROVIDER:     Paulina Krabbe, MD    CONSULT DATE:  2021    HISTORY OF PRESENT ILLNESS:  The patient is a 80-year-old gentleman with  history of CLL who came to the emergency room because of increasing  shortness of breath. He was complaining of occasional cough. He denied  any fever or chills. He denied any significant nausea or vomiting. He  denied any chest pains. The patient tested positive for COVID-19. He  was exposed to his grandson who was COVID positive. The patient did  receive vaccination early this year. His chest x-ray which was done two  days ago showed basilar atelectasis versus scarring. PAST MEDICAL HISTORY:  Significant for CLL, COPD, macular degeneration. PAST SURGICAL HISTORY:  Remarkable for inguinal hernia repair. FAMILY HISTORY:  Reveals that his mother had diabetes. Father had  atherosclerotic heart disease. SOCIAL HISTORY:  Reveals that he is a nonsmoker. No history of alcohol  or drug abuse. CURRENT MEDICATIONS:  Reviewed. ALLERGIES:  He has no known allergies. REVIEW OF SYSTEMS:  Ten- to fourteen-point review of systems are  reviewed and are negative except for what is mentioned in the history of  present illness. PHYSICAL EXAMINATION:  GENERAL:  The patient is alert, oriented x3, in no acute respiratory  distress. VITAL SIGNS:  His blood pressure is 116/68 mmHg, pulse of 86 per minute,  and respiratory rate of 27 per minute. He is afebrile. His saturation  is 91% on room air. HEENT:  Exam is essentially unremarkable. NECK:  There is no JVD. No lymphadenopathy.   The neck is supple. LUNGS:  Exam revealed diminished breath sounds. There are no rhonchi. HEART:  Exam showed normal S1, S2. There was no S3, S4 noted. ABDOMEN:  Exam is benign. There is no evidence of any organomegaly. The bowel sounds are present. NEUROLOGIC:  Exam reveals that he is awake and responsive. Answering  questions appropriately. Moving his extremities. LABORATORY DATA:  His electrolytes were unremarkable. His D-dimer was  222. His CBC showed a white count of 11.3, hemoglobin 15.6, hematocrit  50.1. IMPRESSION:  1. COVID-19 infection with some shortness of breath. 2.  Basilar atelectasis versus pneumonia. 3.  History of COPD. 4.  History of CLL. PLAN:  1. The patient has been started on steroids. 2.  The patient has been started on remdesivir. 3.  Will start albuterol inhaler. 4.  Check procalcitonin level. 5.  Sputum for culture and sensitivity. 6.  Follow COVID markers. 7.  As per orders.         Christina Hopkins MD    D: 04/08/2021 10:46:58       T: 04/08/2021 12:39:31     [email or web address]  Job#: [id number]     Doc#: [id number]    CC:

## 2021-04-08 NOTE — CARE COORDINATION
CM reviewed notes pt is on Room air. CM placed a White board for PT/OT to see pt for recommendations for discharge planning. CM will follow.  [address]

## 2021-04-08 NOTE — PROGRESS NOTES
Occupational Therapy  [facility] OCCUPATIONAL THERAPY EVALUATION    History  [place]:  The primary encounter diagnosis was COVID-19. Diagnoses of Fatigue, unspecified type, Hypoxia, and Elevated troponin were also pertinent to this visit. Restrictions:                           Communication with other providers: PT    Subjective:  Patient states:  \"I feel fine\"  Pain:  none  Patient goal:  home    Occupational profile (relevant social history and personal factors):         Examination of body systems (includes body structures/functions, activity/participation limitations):  · Orientation: WFL   · Cognition:  WFL   · Observation:  Received pt in bed. Alert and cooperative. · Cardiopulm: room air, desats to ~88% during walk  · [facility]   · Hearing:  Mild [place]  · ROM:  WFL BUE  · Strength: WFL BUE  · Sensation: WFL BUE    ADLs  Feeding: INDEP     Grooming: LORRAINE    Dressing: UB SBA in seated LB CGA    Bathing: UB SBA in seated LB CGA    Toileting: CGA    *Some ADL determined per observation of actual ADL performance, functional mobility, balance, activity tolerance, and cognition.      AM-PAC 6 click short form for inpatient daily activity:  Raw Score: 20  24/24 = unimpaired  23/24 = 1-20% impaired   20/24-22/24 = 21-40% impaired  15/24-19/24 = 41-59% impaired   10/24-14/24 = 60%-79% impaired  7/24-9/24 = 80%-99% impaired  6/24 = 100% impaired    Functional Mobility  Bed mobility:   Supine to sit: not tested  Sit to supine: LORRAINE    Sitting balance: good    Transfers:   Sit to stand: SBA from low chair 2x  Stand to sit: SBA to chair and EOB    Standing balance: CGA    Ambulation:  + feet    Activity tolerance  WFL     Assessment:  Assessment  Performance deficits / Impairments: Decreased high-level IADLs, Decreased balance  Treatment Diagnosis: covid-19  Prognosis: Good  Decision Making: Low Complexity  REQUIRES OT FOLLOW UP: No(defer to PT for balance training)  Discharge Recommendations: Home with assist PRN(family can assist with IADL and groceries)      Goals:  By d/c or goals met:     None,     Plan:  Plan  Times per week: n/a      Recommendation for activity with nursing staff:  CGA to walk to bathroom    Treatment today:    None, eval only. Education: Role of OT, OT POC, d/c needs, home safety    Safety: Left in bed with all needs in reach. bed alarm applied. Gait belt used for transfer and mobility. Time in:  1345  Time out:  1400  Timed treatment minutes:  0  Total treatment time:  15    Electronically signed by:    [id number], OTR/LABIGAIL   [id number]   3:54 PM, 4/8/2021

## 2021-04-08 NOTE — CONSULTS
[address] [facility],[address] ACUTE CARE PHYSICAL THERAPY EVALUATION  Kaylee Casiano, 1/24/1930, 2019/2019-A, 4/8/2021    History  [place]:  The primary encounter diagnosis was COVID-19. Diagnoses of Fatigue, unspecified type, Hypoxia, and Elevated troponin were also pertinent to this visit. Patient  has a past medical history of Cardiac murmur, CLL (chronic lymphocytic leukemia) ([facility] [address] COPD (chronic obstructive pulmonary disease) ([facility] [address] and Macular degeneration. Patient  has a past surgical history that includes Inguinal hernia repair. Subjective:  Patient states:  Agreeable to therapy. Pain:  No c/o. Communication with other providers:  Handoff to RN, co-eval with OT. Restrictions: General    Home Setup/Prior level of function  Lives home alone, no SUKI, ind with mobility, ADLs, and iADLs, no DME. Examination of body systems (includes body structures/functions, activity/participation limitations):  · Observation:  Sitting up in chair upon arrival  · Vision:  [place] [facility]  · [facility]  · Cardiopulmonary:  On room air  · Cognition: WFL, see OT/SLP note for further evaluation. Musculoskeletal  · ROM R/L:  WFL. · Strength R/L:  4+/5, min weakness in function and endurance. · Neuro: WFL    · Gait pattern: reciprocal, quick lisa, good endurance, slight R/L deviation but no formal LOB. Mobility:  · Transfers: SBA  · Sitting balance:  SBA. · Standing balance:  SBA. · Gait: CGA    [facility] 6 Clicks Inpatient Mobility:  18/24    Treatment:  Able to stand from chair, ambulated x400ft in hallway, maintained O2 sats above 90, min cues for PLB. Returned to supine for imaging. Safety: patient left in bed with bed alarm, call light within reach, RN notified, gait belt used. Assessment:  Patient is a 79 yo male who presents with COVID, and worsening SOB. Patient demonstrates sufficient mobility to return home. Rec d/c home with PRN assist with family.     Complexity: Moderate  Prognosis: Good, no significant barriers to participation at this time. Plan Times per week: 1+/week, 1 week,      Equipment: TBD    Goals:  Short term goals  Time Frame for Short term goals: 1 week  Short term goal 1: Patient will ambulate mod I x400ft, no AD, with stable O2 sats. Treatment plan:  Bed mobility, transfers, balance, gait, TA, TX. Recommendations for NURSING mobility: ambulate in hallway daily.     Time:   Time in: 1348  Time out: 1400  Timed treatment minutes: 0  Total time: 12    Electronically signed by:    Madeline Yun, PT  4/8/2021, 2:50 PM

## 2021-04-08 NOTE — PROGRESS NOTES
Hospitalist Progress Note      Name:  Jacob Silverio /Age/Sex: 1930  (80 y.o. male)   MRN & CSN:  [id number] & [id number] Admission Date/Time: 2021  1:52 PM   Location:  -A PCP: Valerie Welsh MD         Hospital Day: 3    Assessment and Plan:   Jacob Silverio is a 80 y.o.  male  who presents with COVID-19    Acute Hypoxic Respiratory Failure:   · due to COVID-19 pneumonia  · Requiring O2 via nasal cannula, saturation 96% at 3 L. Had hypoxemic episode at 86% when taken off the O2 support  · CXR mild left ventricular enlargement without interstitial edema. · Inflammatory markers evaded LD, slightly elevated CRP, slightly elevated D-dimer. · Given Tocilizumab x 1  · Remdesivir not given because of acute kidney injury initially, creatinine has improved. Day 2 Will give for 5 days. · Started on dexamethasone  · DVT prophylaxis with Lovenox. · Pulmonology on consult. 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 CLL  · Continue Ibrutinib. · Oncology on consult    Acute metabolic encephalopathy  · Could be from delirium, COVID-19  · Sitter at the bedside. D/W Son Dr. Lisa Rhodes. Diet DIET GENERAL;   DVT Prophylaxis [x] Lovenox, []  Heparin, [] SCDs, [] Warfarin  [] NOAC     GI Prophylaxis [] PPI,  [] H2 Blocker,  [] Carafate,  [] Diet/Tube Feeds   Code Status DNR-CCA   MDM [] Low, [] Moderate,[x]  High     History of Present Illness:     Chief Complaint: [id number]    Seen and examined today. Had confusion yesterday requiring remote sitter. More awake today and less confused.  Telemetry with new onset atrial fibrillation    Ten point ROS reviewed negative, unless as noted above    Objective:     No intake or output data in the 24 hours ending 21 1029   Vitals: Vitals:    04/08/21 0822   BP: 116/68   Pulse: 86   Resp: 27   Temp: 97.8 °F (36.6 °C)   SpO2: 91%     Physical Exam:   GEN Awake male, sitting upright in bed in no apparent distress. Appears given age. On oxygen via nasal cannula 3 L. Awake, less confused  EYES Pupils are equally round. No scleral erythema, discharge, or conjunctivitis. HENT Mucous membranes are moist. Oral pharynx without exudates, no evidence of thrush. NECK Supple, no apparent thyromegaly or masses. RESP Clear to auscultation, no wheezes, rales or rhonchi. Symmetric chest movement while on room air. CARDIO/VASC Irregularly irregular  GI Abdomen is soft without significant tenderness, masses, or guarding. Bowel sounds are normoactive. Rectal exam deferred. MSK No gross joint deformities.     Medications:   Medications:    ibrutinib  420 mg Oral Daily    amLODIPine  5 mg Oral Daily    famotidine  20 mg Oral BID    remdesivir IVPB  100 mg Intravenous Q24H    sodium chloride flush  5-40 mL Intravenous 2 times per day    enoxaparin  40 mg Subcutaneous Nightly    dexamethasone  6 mg Oral Daily    Vitamin D  2,000 Units Oral Daily      Infusions:    sodium chloride       PRN Meds: sodium chloride, 30 mL, PRN  sodium chloride flush, 5-40 mL, PRN  sodium chloride, 25 mL, PRN  promethazine, 12.5 mg, Q6H PRN    Or  ondansetron, 4 mg, Q6H PRN  polyethylene glycol, 17 g, Daily PRN  acetaminophen, 650 mg, Q6H PRN    Or  acetaminophen, 650 mg, Q6H PRN  potassium chloride, 40 mEq, PRN    Or  potassium alternative oral replacement, 40 mEq, PRN    Or  potassium chloride, 10 mEq, PRN        Recent Labs     04/06/21  1422 04/07/21  0930 04/08/21  0600   WBC 9.3 6.7 11.3*   HGB 15.0 16.3 15.6   HCT 45.7 52.1* 50.1    174 208      Recent Labs     04/06/21  1422 04/07/21  0930 04/08/21  0600    144 142   K 4.4 4.5 4.7    108 108   CO2 24 24 25   BUN 37* 38* 39*   CREATININE 1.7* 1.5* 1.3     Recent Labs     04/06/21  1424 04/07/21  0930 04/08/21  0600   AST 33 34 28  28   ALT 15 16 15  15   BILIDIR  --   --  0.2   BILITOT 0.6 0.4 0.3  0.3   ALKPHOS 72 83 72  72     No results for input(s): INR in the last 72 hours.   Recent Labs     04/06/21  1422 04/07/21  0930   TROPONINT 0.014* <0.010        Imaging reviewed      Electronically signed by Curly Cornell MD on 4/8/2021 at 10:29 AM

## 2021-04-09 ENCOUNTER — APPOINTMENT (OUTPATIENT)
Dept: GENERAL RADIOLOGY | Age: 86
DRG: 177 | End: 2021-04-09
Payer: MEDICARE

## 2021-04-09 LAB
ALBUMIN SERPL-MCNC: 3.8 GM/DL (ref 3.4–5)
ALBUMIN SERPL-MCNC: 3.8 GM/DL (ref 3.4–5)
ALP BLD-CCNC: 81 IU/L (ref 40–128)
ALP BLD-CCNC: 81 IU/L (ref 40–129)
ALT SERPL-CCNC: 36 U/L (ref 10–40)
ALT SERPL-CCNC: 36 U/L (ref 10–40)
ANION GAP SERPL CALCULATED.3IONS-SCNC: 11 MMOL/L (ref 4–16)
AST SERPL-CCNC: 48 IU/L (ref 15–37)
AST SERPL-CCNC: 48 IU/L (ref 15–37)
BILIRUB SERPL-MCNC: 0.5 MG/DL (ref 0–1)
BILIRUB SERPL-MCNC: 0.5 MG/DL (ref 0–1)
BILIRUBIN DIRECT: 0.2 MG/DL (ref 0–0.3)
BILIRUBIN, INDIRECT: 0.3 MG/DL (ref 0–0.7)
BUN BLDV-MCNC: 36 MG/DL (ref 6–23)
CALCIUM SERPL-MCNC: 8.5 MG/DL (ref 8.3–10.6)
CHLORIDE BLD-SCNC: 107 MMOL/L (ref 99–110)
CO2: 24 MMOL/L (ref 21–32)
CREAT SERPL-MCNC: 1.2 MG/DL (ref 0.9–1.3)
D DIMER: <200 NG/ML(DDU)
EKG ATRIAL RATE: 197 BPM
EKG DIAGNOSIS: NORMAL
EKG Q-T INTERVAL: 342 MS
EKG QRS DURATION: 96 MS
EKG QTC CALCULATION (BAZETT): 416 MS
EKG R AXIS: 20 DEGREES
EKG T AXIS: -13 DEGREES
EKG VENTRICULAR RATE: 89 BPM
GFR AFRICAN AMERICAN: >60 ML/MIN/1.73M2
GFR NON-AFRICAN AMERICAN: 57 ML/MIN/1.73M2
GLUCOSE BLD-MCNC: 134 MG/DL (ref 70–99)
HCT VFR BLD CALC: 54.1 % (ref 42–52)
HEMOGLOBIN: 17.4 GM/DL (ref 13.5–18)
HIGH SENSITIVE C-REACTIVE PROTEIN: 9.9 MG/L
MCH RBC QN AUTO: 29.4 PG (ref 27–31)
MCHC RBC AUTO-ENTMCNC: 32.2 % (ref 32–36)
MCV RBC AUTO: 91.5 FL (ref 78–100)
PDW BLD-RTO: 13.7 % (ref 11.7–14.9)
PHOSPHORUS: 2.9 MG/DL (ref 2.5–4.9)
PLATELET # BLD: 246 K/CU MM (ref 140–440)
PMV BLD AUTO: 11.1 FL (ref 7.5–11.1)
POTASSIUM SERPL-SCNC: 4.9 MMOL/L (ref 3.5–5.1)
PRO-BNP: 3963 PG/ML
PROCALCITONIN: 0.05
RBC # BLD: 5.91 M/CU MM (ref 4.6–6.2)
SODIUM BLD-SCNC: 142 MMOL/L (ref 135–145)
TOTAL PROTEIN: 5.7 GM/DL (ref 6.4–8.2)
TOTAL PROTEIN: 5.7 GM/DL (ref 6.4–8.2)
TROPONIN T: <0.01 NG/ML
WBC # BLD: 16.7 K/CU MM (ref 4–10.5)

## 2021-04-09 PROCEDURE — 1200000000 HC SEMI PRIVATE

## 2021-04-09 PROCEDURE — 71045 X-RAY EXAM CHEST 1 VIEW: CPT

## 2021-04-09 PROCEDURE — 94761 N-INVAS EAR/PLS OXIMETRY MLT: CPT

## 2021-04-09 PROCEDURE — 84484 ASSAY OF TROPONIN QUANT: CPT

## 2021-04-09 PROCEDURE — 84145 PROCALCITONIN (PCT): CPT

## 2021-04-09 PROCEDURE — 84100 ASSAY OF PHOSPHORUS: CPT

## 2021-04-09 PROCEDURE — 80048 BASIC METABOLIC PNL TOTAL CA: CPT

## 2021-04-09 PROCEDURE — 86141 C-REACTIVE PROTEIN HS: CPT

## 2021-04-09 PROCEDURE — 85379 FIBRIN DEGRADATION QUANT: CPT

## 2021-04-09 PROCEDURE — 6370000000 HC RX 637 (ALT 250 FOR IP): Performed by: INTERNAL MEDICINE

## 2021-04-09 PROCEDURE — 93010 ELECTROCARDIOGRAM REPORT: CPT | Performed by: INTERNAL MEDICINE

## 2021-04-09 PROCEDURE — 82248 BILIRUBIN DIRECT: CPT

## 2021-04-09 PROCEDURE — 2580000003 HC RX 258: Performed by: INTERNAL MEDICINE

## 2021-04-09 PROCEDURE — 94640 AIRWAY INHALATION TREATMENT: CPT

## 2021-04-09 PROCEDURE — 80053 COMPREHEN METABOLIC PANEL: CPT

## 2021-04-09 PROCEDURE — 2500000003 HC RX 250 WO HCPCS: Performed by: INTERNAL MEDICINE

## 2021-04-09 PROCEDURE — 85027 COMPLETE CBC AUTOMATED: CPT

## 2021-04-09 PROCEDURE — 99232 SBSQ HOSP IP/OBS MODERATE 35: CPT | Performed by: INTERNAL MEDICINE

## 2021-04-09 PROCEDURE — 6360000002 HC RX W HCPCS: Performed by: INTERNAL MEDICINE

## 2021-04-09 PROCEDURE — 83880 ASSAY OF NATRIURETIC PEPTIDE: CPT

## 2021-04-09 RX ORDER — FUROSEMIDE 40 MG/1
40 TABLET ORAL DAILY
Status: DISCONTINUED | OUTPATIENT
Start: 2021-04-09 | End: 2021-04-10 | Stop reason: HOSPADM

## 2021-04-09 RX ADMIN — ALBUTEROL SULFATE 2 PUFF: 90 AEROSOL, METERED RESPIRATORY (INHALATION) at 16:06

## 2021-04-09 RX ADMIN — FAMOTIDINE 20 MG: 20 TABLET ORAL at 08:37

## 2021-04-09 RX ADMIN — SODIUM CHLORIDE, PRESERVATIVE FREE 10 ML: 5 INJECTION INTRAVENOUS at 08:38

## 2021-04-09 RX ADMIN — Medication 2000 UNITS: at 08:37

## 2021-04-09 RX ADMIN — REMDESIVIR 100 MG: 100 INJECTION, POWDER, LYOPHILIZED, FOR SOLUTION INTRAVENOUS at 17:26

## 2021-04-09 RX ADMIN — AMLODIPINE BESYLATE 5 MG: 5 TABLET ORAL at 08:37

## 2021-04-09 RX ADMIN — SODIUM CHLORIDE, PRESERVATIVE FREE 10 ML: 5 INJECTION INTRAVENOUS at 21:03

## 2021-04-09 RX ADMIN — ALBUTEROL SULFATE 2 PUFF: 90 AEROSOL, METERED RESPIRATORY (INHALATION) at 11:57

## 2021-04-09 RX ADMIN — DEXAMETHASONE 6 MG: 4 TABLET ORAL at 08:38

## 2021-04-09 RX ADMIN — ALBUTEROL SULFATE 2 PUFF: 90 AEROSOL, METERED RESPIRATORY (INHALATION) at 08:03

## 2021-04-09 RX ADMIN — FAMOTIDINE 20 MG: 20 TABLET ORAL at 21:03

## 2021-04-09 RX ADMIN — RIVAROXABAN 15 MG: 15 TABLET, FILM COATED ORAL at 17:26

## 2021-04-09 RX ADMIN — FUROSEMIDE 40 MG: 40 TABLET ORAL at 17:26

## 2021-04-09 NOTE — CONSULTS
Hematology/Oncology  Consult Note      Reason for Consult: CLL  Requesting Physician: Dr. Cross : Shortness of breath    History Obtained From:  electronic medical record, hospitalist    HISTORY OF PRESENT ILLNESS:      The patient is a 80 y.o. male with significant past medical history of CLL who presents with shortness of breath. He had Covid vaccination in the beginning of 2021. He had contact with COVID-19 infected grandson. On this admission chest x-ray was unremarkable and he was tested positive for Covid 19. He was started on dexamethasone and Tocilizumab. Due to underlying renal disease remdesivir was not given. I talked to hospitalist and recommend he continue with ibrutinib. Past Medical History:        Diagnosis Date    Cardiac murmur     DEANNA noted on exam 7/14/20--- monitoring for now, consider echo- PT DEFERS. I SUSPECT ASYMPTOMATIC AS.  CLL (chronic lymphocytic leukemia) ([facility] [address] 2014    dx'd 2014, sees Dr Billy Millerogrvanessa    [facility]ch[facility]    INCIDENTAL FINDING ON CHEST CT 2018- NEVER SMOKED AND NO SX, NO INHALERS.  Macular degeneration     R>L, gets injections, is to see Dr Verena Allen.      Past Surgical History:        Procedure Laterality Date    INGUINAL HERNIA REPAIR      remote, unsure which side       Current Medications:    Current Facility-Administered Medications: albuterol sulfate  (90 Base) MCG/ACT inhaler 2 puff, 2 puff, Inhalation, 4x daily  rivaroxaban (XARELTO) tablet 15 mg, 15 mg, Oral, Daily  ibrutinib (IMBRUVICA) chemo tablet 420 mg, 420 mg, Oral, Daily  amLODIPine (NORVASC) tablet 5 mg, 5 mg, Oral, Daily  famotidine (PEPCID) tablet 20 mg, 20 mg, Oral, BID  [COMPLETED] remdesivir 200 mg in sodium chloride 0.9 % 250 mL IVPB, 200 mg, Intravenous, Once **FOLLOWED BY** remdesivir 100 mg in sodium chloride 0.9 % 250 mL IVPB, 100 mg, Intravenous, Q24H  0.9 % sodium chloride bolus, 30 mL, Intravenous, PRN  sodium chloride flush 0.9 % injection 5-40 mL, 5-40 mL, Intravenous, 2 times per day  sodium chloride flush 0.9 % injection 5-40 mL, 5-40 mL, Intravenous, PRN  0.9 % sodium chloride infusion, 25 mL, Intravenous, PRN  promethazine (PHENERGAN) tablet 12.5 mg, 12.5 mg, Oral, Q6H PRN **OR** ondansetron (ZOFRAN) injection 4 mg, 4 mg, Intravenous, Q6H PRN  polyethylene glycol (GLYCOLAX) packet 17 g, 17 g, Oral, Daily PRN  acetaminophen (TYLENOL) tablet 650 mg, 650 mg, Oral, Q6H PRN **OR** acetaminophen (TYLENOL) suppository 650 mg, 650 mg, Rectal, Q6H PRN  potassium chloride (KLOR-CON M) extended release tablet 40 mEq, 40 mEq, Oral, PRN **OR** potassium bicarb-citric acid (EFFER-K) effervescent tablet 40 mEq, 40 mEq, Oral, PRN **OR** potassium chloride 10 mEq/100 mL IVPB (Peripheral Line), 10 mEq, Intravenous, PRN  dexamethasone (DECADRON) tablet 6 mg, 6 mg, Oral, Daily  Vitamin D (CHOLECALCIFEROL) tablet 2,000 Units, 2,000 Units, Oral, Daily    Allergies:  Patient has no known allergies. Social History:    TOBACCO:   reports that he has never smoked. He has never used smokeless tobacco.  ETOH:   reports no history of alcohol use. DRUGS:   reports no history of drug use. Family History:       Problem Relation Age of Onset    Diabetes Mother     ADHD Father      REVIEW OF SYSTEMS:    He is in the Covid unit.   I discussed case with the hospitalist.    PHYSICAL EXAM:      Vitals:    BP (!) 145/96   Pulse 90   Temp 97.6 °F (36.4 °C) (Oral)   Resp 18   Ht 5' 11.5\" (1.816 m)   Wt 170 lb 3.1 oz (77.2 kg)   SpO2 92%   BMI 23.41 kg/m²     Deferred    DATA:    Labs:  General Labs:    CBC with Differential:    Lab Results   Component Value Date    WBC 16.7 04/09/2021    RBC 5.91 04/09/2021    HGB 17.4 04/09/2021    HCT 54.1 04/09/2021     04/09/2021    MCV 91.5 04/09/2021    MCH 29.4 04/09/2021    MCHC 32.2 04/09/2021    RDW 13.7 04/09/2021    SEGSPCT 38.3 04/07/2021    LYMPHOPCT 60.1 04/07/2021    MONOPCT

## 2021-04-09 NOTE — CARE COORDINATION
[address] Transitions     2021    Patient: Shira Manuel Patient : 1930   MRN: [id number]  Reason for Admission: Covid 19 Pna, A/C Resp Failure, New Onset A Fib, INES  RARS: Readmission Risk Score: 16    NH PREDICT TOOL - uploaded to chart.      NH Predict Tool Recommendation: [facility] for greater functional gain or SNF w/ anticipated los 12 days  PT Recommendation: Home w/ prn assist from family  OT Recommendation: Home with assist prn  Current Discharge Plans: Pending  Collaboration w/ Case Mgt: No as Case Mgt note pending; PT/OT recommendations in alignment w/ NH Predict Tool      Kia Cantor RN

## 2021-04-09 NOTE — PROGRESS NOTES
Today's plan: ECHO SHOWED SEVERE AS, WILL RECOMMEND OUT PATIENT FOLLOW UP ONCE COVID IS RESOLVED TO DISCUSS TREATMENT PLAN, continue xatelto for afib    Ok to use lasix if needed    Admit Date:  4/6/2021    Subjective:marissa      Chief complaints on admission  Chief Complaint   Patient presents with    Fatigue    Shortness of Breath         History of present illness:Rene is a 80 y. o.year old who  presents with had concerns including Fatigue and Shortness of Breath. Past medical history:    has a past medical history of Cardiac murmur, CLL (chronic lymphocytic leukemia) ([facility] [address] COPD (chronic obstructive pulmonary disease) ([facility] [address] and Macular degeneration. Past surgical history:   has a past surgical history that includes Inguinal hernia repair. Social History:   reports that he has never smoked. He has never used smokeless tobacco. He reports that he does not drink alcohol or use drugs. Family history:  family history includes ADHD in his father; Diabetes in his mother. No Known Allergies      Objective:   BP (!) 153/90   Pulse 86   Temp 97.9 °F (36.6 °C) (Oral)   Resp 24   Ht 5' 11.5\" (1.816 m)   Wt 170 lb 3.1 oz (77.2 kg)   SpO2 91%   BMI 23.41 kg/m²   No intake or output data in the 24 hours ending 04/09/21 1244        Physical Exam:  Constitutional:  Well developed, Well nourished, No acute distress, Non-toxic appearance. HENT:  Normocephalic, Atraumatic, Bilateral external ears normal, Oropharynx moist, No oral exudates, Nose normal. Neck- Normal range of motion, No tenderness, Supple, No stridor. Eyes:  PERRL, EOMI, Conjunctiva normal, No discharge. Respiratory:  Normal breath sounds, No respiratory distress, No wheezing, No chest tenderness. ,no use of accessory muscles, diaphragm movement is normal  Cardiovascular: (PMI) apex non displaced,no lifts no thrills, no s3,no s4, Normal heart rate, Normal rhythm, No murmurs, No rubs, No gallops.  Carotid arteries pulse and amplitude are normal no bruit, no abdominal bruit noted ( normal abdominal aorta ausculation), femoral arteries pulse and amplitude are normal no bruit, pedal pulses are normal  GI:  Bowel sounds normal, Soft, No tenderness, No masses, No pulsatile masses. : External genitalia appear normal, No masses or lesions. No discharge. No CVA tenderness. Musculoskeletal:  Intact distal pulses, No edema, No tenderness, No cyanosis, No clubbing. Good range of motion in all major joints. No tenderness to palpation or major deformities noted. Back- No tenderness. Integument:  Warm, Dry, No erythema, No rash. Lymphatic:  No lymphadenopathy noted. Neurologic:  Alert & oriented x 3, Normal motor function, Normal sensory function, No focal deficits noted.    Psychiatric:  Affect normal, Judgment normal, Mood normal.     Medications:    albuterol sulfate HFA  2 puff Inhalation 4x daily    rivaroxaban  15 mg Oral Daily    ibrutinib  420 mg Oral Daily    amLODIPine  5 mg Oral Daily    famotidine  20 mg Oral BID    remdesivir IVPB  100 mg Intravenous Q24H    sodium chloride flush  5-40 mL Intravenous 2 times per day    dexamethasone  6 mg Oral Daily    Vitamin D  2,000 Units Oral Daily      sodium chloride       sodium chloride, sodium chloride flush, sodium chloride, promethazine **OR** ondansetron, polyethylene glycol, acetaminophen **OR** acetaminophen, potassium chloride **OR** potassium alternative oral replacement **OR** potassium chloride    Lab Data:  CBC:   Recent Labs     04/07/21  0930 04/08/21  0600 04/09/21  0619   WBC 6.7 11.3* 16.7*   HGB 16.3 15.6 17.4   HCT 52.1* 50.1 54.1*   MCV 93.9 93.8 91.5    208 246     BMP:   Recent Labs     04/07/21  0930 04/08/21  0600 04/09/21  0619    142 142   K 4.5 4.7 4.9    108 107   CO2 24 25 24   PHOS  --   --  2.9   BUN 38* 39* 36*   CREATININE 1.5* 1.3 1.2     LIVER PROFILE:   Recent Labs     04/07/21 0930 04/08/21  0600 04/09/21  0619   AST 34 28  28 48* 48*   ALT 16 15  15 36  36   BILIDIR  --  0.2 0.2   BILITOT 0.4 0.3  0.3 0.5  0.5   ALKPHOS 83 72  72 81  81     PT/INR: No results for input(s): PROTIME, INR in the last 72 hours. APTT: No results for input(s): APTT in the last 72 hours. BNP:  No results for input(s): BNP in the last 72 hours. TROPONIN: @TROPONINI:3@      Assessment:  80 y. o.year old who is admitted for          Plan:  1. COVID 19: admitted to hospital, continue care as per medicine  2. Afib; Most likely chronic, CHADs vasc score is 2 add xatelto   3. Severe AS as above  COPD: continue oxygen and nebulizerAll labs, medications and tests reviewed, continue all other medications of all above medical condition listed as is.       Heri Louie MD 4/9/2021 12:44 PM

## 2021-04-09 NOTE — PROGRESS NOTES
Hospitalist Progress Note      Name:  Tirso Kendrick /Age/Sex: 1930  (80 y.o. male)   MRN & CSN:  [id number] & [id number] Admission Date/Time: 2021  1:52 PM   Location:  -A PCP: Laine Deleon MD         Hospital Day: 4    Assessment and Plan:   Tirso Kendrick is a 80 y.o.  male  who presents with COVID-19    Acute Hypoxic Respiratory Failure: Resolved  · due to COVID-19 pneumonia  · Requiring O2 via nasal cannula, saturation 96% at 3 L. Now at RA 91%  · CXR mild left ventricular enlargement without interstitial edema. · Inflammatory markers elevated LD, slightly elevated CRP, slightly elevated D-dimer. · Given Tocilizumab x 1  · Remdesivir not given because of acute kidney injury initially, creatinine has improved. Day 3 Will give for 5 days. · Started on dexamethasone  · Pulmonology on consult. New Onset Atrial Fibrillation, rate controlled  · Check electrolytes, Echocardiogram, TSH  · CHADSVASC 2, started on Rivaoxaban  · Cardiology on consult    Severe aortic stenosis with congestive heart failure exacerbation, systolic, acute  · Elevated BNP, chest x-ray with congestion. · started on Lasix daily. · Outpatient follow-up. 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 CLL  · Continue Ibrutinib. · Oncology on consult    Acute metabolic encephalopathy  · Could be from delirium, COVID-19  · Remote sitter    D/W Son Dr. Ioana Huizar. Diet DIET GENERAL;   DVT Prophylaxis [x] Lovenox, []  Heparin, [] SCDs, [] Warfarin  [] NOAC     GI Prophylaxis [] PPI,  [] H2 Blocker,  [] Carafate,  [] Diet/Tube Feeds   Code Status DNR-CCA   MDM [] Low, [] Moderate,[x]  High     History of Present Illness:     Chief Complaint: [id number]    Seen and examined today. Still with on and off confusion. Awake this morning. Not requiring oxygen. No cough. No shortness of breath.     Ten point ROS reviewed negative, unless as noted above    Objective:     No intake or output data in the 24 hours ending 04/09/21 1303   Vitals:   Vitals:    04/09/21 0800   BP: (!) 153/90   Pulse: 86   Resp: 24   Temp: 97.9 °F (36.6 °C)   SpO2: 91%     Physical Exam:   GEN Awake male, sitting upright in bed in no apparent distress. Appears given age. On oxygen via nasal cannula 3 L. Awake, less confused  EYES Pupils are equally round. No scleral erythema, discharge, or conjunctivitis. HENT Mucous membranes are moist. Oral pharynx without exudates, no evidence of thrush. NECK Supple, no apparent thyromegaly or masses. RESP crackles bilaterally  CARDIO/VASC Irregularly irregular  GI Abdomen is soft without significant tenderness, masses, or guarding. Bowel sounds are normoactive. Rectal exam deferred. MSK No gross joint deformities.     Medications:   Medications:    furosemide  40 mg Oral Daily    albuterol sulfate HFA  2 puff Inhalation 4x daily    rivaroxaban  15 mg Oral Daily    ibrutinib  420 mg Oral Daily    amLODIPine  5 mg Oral Daily    famotidine  20 mg Oral BID    remdesivir IVPB  100 mg Intravenous Q24H    sodium chloride flush  5-40 mL Intravenous 2 times per day    dexamethasone  6 mg Oral Daily    Vitamin D  2,000 Units Oral Daily      Infusions:    sodium chloride       PRN Meds: sodium chloride, 30 mL, PRN  sodium chloride flush, 5-40 mL, PRN  sodium chloride, 25 mL, PRN  promethazine, 12.5 mg, Q6H PRN    Or  ondansetron, 4 mg, Q6H PRN  polyethylene glycol, 17 g, Daily PRN  acetaminophen, 650 mg, Q6H PRN    Or  acetaminophen, 650 mg, Q6H PRN  potassium chloride, 40 mEq, PRN    Or  potassium alternative oral replacement, 40 mEq, PRN    Or  potassium chloride, 10 mEq, PRN        Recent Labs     04/07/21  0930 04/08/21  0600 04/09/21  0619   WBC 6.7 11.3* 16.7*   HGB 16.3 15.6 17.4   HCT 52.1* 50.1 54.1*    208 246      Recent Labs     04/07/21  0930 04/08/21  0600 04/09/21  0619    142 142   K 4.5 4.7 4.9  108 107   CO2 24 25 24   PHOS  --   --  2.9   BUN 38* 39* 36*   CREATININE 1.5* 1.3 1.2     Recent Labs     04/07/21  0930 04/08/21  0600 04/09/21  0619   AST 34 28  28 48*  48*   ALT 16 15  15 36  36   BILIDIR  --  0.2 0.2   BILITOT 0.4 0.3  0.3 0.5  0.5   ALKPHOS 83 72  72 81  81     No results for input(s): INR in the last 72 hours.   Recent Labs     04/06/21  1422 04/07/21  0930 04/09/21  0619   TROPONINT 0.014* <0.010 <0.010      Imaging reviewed    Electronically signed by José Antonio Gurrola MD on 4/9/2021 at 1:03 PM

## 2021-04-09 NOTE — PROGRESS NOTES
Pulmonary and Critical Care  Progress Note    Subjective: The patient is unchanged  Shortness of breath none  Chest pain none  Addressing respiratory complaints Patient is negative for  hemoptysis and cyanosis  CONSTITUTIONAL:  negative for fevers and chills      Past Medical History:     has a past medical history of Cardiac murmur, CLL (chronic lymphocytic leukemia) ([facility] [address] COPD (chronic obstructive pulmonary disease) ([facility] [address] and Macular degeneration. has a past surgical history that includes Inguinal hernia repair. reports that he has never smoked. He has never used smokeless tobacco. He reports that he does not drink alcohol or use drugs. Family history:  family history includes ADHD in his father; Diabetes in his mother. No Known Allergies  Social History:    Reviewed; no changes    Objective:   PHYSICAL EXAM:        VITALS:  BP (!) 153/90   Pulse 86   Temp 97.9 °F (36.6 °C) (Oral)   Resp 24   Ht 5' 11.5\" (1.816 m)   Wt 170 lb 3.1 oz (77.2 kg)   SpO2 91%   BMI 23.41 kg/m²     24HR INTAKE/OUTPUT:  No intake or output data in the 24 hours ending 04/09/21 1143    CONSTITUTIONAL:  awake, alert, cooperative, no apparent distress, and appears stated age  LUNGS:  decreased breath sounds, occ basilar crackles. CARDIOVASCULAR:  normal S1 and S2 and negative JVD  ABD:Abdomen soft, non-tender. BS normal. No masses,  No organomegaly  NEURO:Alert and oriented x3. Gait normal. Reflexes and motor strength normal and symmetric. Cranial nerves 2-12 and sensation grossly intact.   DATA:    CBC:  Recent Labs     04/06/21  1422 04/07/21  0930 04/08/21  0600 04/09/21  0619   WBC 9.3 6.7 11.3* 16.7*   RBC 4.95 5.55 5.34 5.91   HGB 15.0 16.3 15.6 17.4   HCT 45.7 52.1* 50.1 54.1*    174 208 246   MCV 92.3 93.9 93.8 91.5   MCH 30.3 29.4 29.2 29.4   MCHC 32.8 31.3* 31.1* 32.2   RDW 14.2 14.1 13.8 13.7   SEGSPCT 54.6 38.3  --   --       BMP:  Recent Labs     04/07/21  0930 04/08/21  0600 04/09/21  0619    084 142   K 4.5 4.7 4.9    108 107   CO2 24 25 24   BUN 38* 39* 36*   CREATININE 1.5* 1.3 1.2   CALCIUM 8.3 8.2* 8.5   GLUCOSE 193* 137* 134*      ABG:  No results for input(s): PH, PO2ART, EYP7JOG, HCO3, BEART, O2SAT in the last 72 hours. Lab Results   Component Value Date    PROBNP 3,963 (H) 04/09/2021    PROBNP 862.2 (H) 04/06/2021     No results found for: [facility]    Radiology Review:  Pertinent images / reports were reviewed as a part of this visit. Assessment:     Patient Active Problem List   Diagnosis    [facility] [address]    COPD (chronic obstructive pulmonary disease) ([facility] [address]    Macular degeneration    Cardiac murmur    COVID-19       Plan:   1. Overall the patient is doing OK. 2. Inc.activity.       Antony Ladd MD  4/9/2021  11:43 AM

## 2021-04-10 VITALS
WEIGHT: 165.12 LBS | HEART RATE: 99 BPM | OXYGEN SATURATION: 93 % | RESPIRATION RATE: 24 BRPM | TEMPERATURE: 97.6 F | BODY MASS INDEX: 22.37 KG/M2 | HEIGHT: 72 IN | SYSTOLIC BLOOD PRESSURE: 134 MMHG | DIASTOLIC BLOOD PRESSURE: 86 MMHG

## 2021-04-10 LAB
ALBUMIN SERPL-MCNC: 3.7 GM/DL (ref 3.4–5)
ALBUMIN SERPL-MCNC: 3.7 GM/DL (ref 3.4–5)
ALP BLD-CCNC: 80 IU/L (ref 40–128)
ALP BLD-CCNC: 80 IU/L (ref 40–129)
ALT SERPL-CCNC: 82 U/L (ref 10–40)
ALT SERPL-CCNC: 82 U/L (ref 10–40)
ANION GAP SERPL CALCULATED.3IONS-SCNC: 12 MMOL/L (ref 4–16)
AST SERPL-CCNC: 63 IU/L (ref 15–37)
AST SERPL-CCNC: 63 IU/L (ref 15–37)
BILIRUB SERPL-MCNC: 0.5 MG/DL (ref 0–1)
BILIRUB SERPL-MCNC: 0.5 MG/DL (ref 0–1)
BILIRUBIN DIRECT: 0.2 MG/DL (ref 0–0.3)
BILIRUBIN, INDIRECT: 0.3 MG/DL (ref 0–0.7)
BUN BLDV-MCNC: 36 MG/DL (ref 6–23)
CALCIUM SERPL-MCNC: 8.3 MG/DL (ref 8.3–10.6)
CHLORIDE BLD-SCNC: 107 MMOL/L (ref 99–110)
CO2: 22 MMOL/L (ref 21–32)
CREAT SERPL-MCNC: 1.2 MG/DL (ref 0.9–1.3)
GFR AFRICAN AMERICAN: >60 ML/MIN/1.73M2
GFR NON-AFRICAN AMERICAN: 57 ML/MIN/1.73M2
GLUCOSE BLD-MCNC: 145 MG/DL (ref 70–99)
HCT VFR BLD CALC: 50.6 % (ref 42–52)
HEMOGLOBIN: 16.9 GM/DL (ref 13.5–18)
HIGH SENSITIVE C-REACTIVE PROTEIN: 5.7 MG/L
MCH RBC QN AUTO: 29.9 PG (ref 27–31)
MCHC RBC AUTO-ENTMCNC: 33.4 % (ref 32–36)
MCV RBC AUTO: 89.6 FL (ref 78–100)
PDW BLD-RTO: 13.5 % (ref 11.7–14.9)
PLATELET # BLD: 258 K/CU MM (ref 140–440)
PMV BLD AUTO: 10.7 FL (ref 7.5–11.1)
POTASSIUM SERPL-SCNC: 4.4 MMOL/L (ref 3.5–5.1)
PROCALCITONIN: 0.06
RBC # BLD: 5.65 M/CU MM (ref 4.6–6.2)
SODIUM BLD-SCNC: 141 MMOL/L (ref 135–145)
TOTAL PROTEIN: 5.5 GM/DL (ref 6.4–8.2)
TOTAL PROTEIN: 5.5 GM/DL (ref 6.4–8.2)
WBC # BLD: 14.9 K/CU MM (ref 4–10.5)

## 2021-04-10 PROCEDURE — 94640 AIRWAY INHALATION TREATMENT: CPT

## 2021-04-10 PROCEDURE — 2700000000 HC OXYGEN THERAPY PER DAY

## 2021-04-10 PROCEDURE — 99231 SBSQ HOSP IP/OBS SF/LOW 25: CPT | Performed by: INTERNAL MEDICINE

## 2021-04-10 PROCEDURE — 80053 COMPREHEN METABOLIC PANEL: CPT

## 2021-04-10 PROCEDURE — 6370000000 HC RX 637 (ALT 250 FOR IP): Performed by: INTERNAL MEDICINE

## 2021-04-10 PROCEDURE — 2500000003 HC RX 250 WO HCPCS: Performed by: INTERNAL MEDICINE

## 2021-04-10 PROCEDURE — 82248 BILIRUBIN DIRECT: CPT

## 2021-04-10 PROCEDURE — 86141 C-REACTIVE PROTEIN HS: CPT

## 2021-04-10 PROCEDURE — 80048 BASIC METABOLIC PNL TOTAL CA: CPT

## 2021-04-10 PROCEDURE — 6360000002 HC RX W HCPCS: Performed by: INTERNAL MEDICINE

## 2021-04-10 PROCEDURE — 85027 COMPLETE CBC AUTOMATED: CPT

## 2021-04-10 PROCEDURE — APPSS30 APP SPLIT SHARED TIME 16-30 MINUTES: Performed by: NURSE PRACTITIONER

## 2021-04-10 PROCEDURE — 94760 N-INVAS EAR/PLS OXIMETRY 1: CPT

## 2021-04-10 PROCEDURE — 2580000003 HC RX 258: Performed by: INTERNAL MEDICINE

## 2021-04-10 PROCEDURE — 84145 PROCALCITONIN (PCT): CPT

## 2021-04-10 RX ORDER — ALBUTEROL SULFATE 90 UG/1
2 AEROSOL, METERED RESPIRATORY (INHALATION) EVERY 4 HOURS PRN
Qty: 1 INHALER | Refills: 3 | Status: SHIPPED | OUTPATIENT
Start: 2021-04-10

## 2021-04-10 RX ORDER — POTASSIUM CHLORIDE 750 MG/1
10 TABLET, EXTENDED RELEASE ORAL DAILY
Qty: 7 TABLET | Refills: 0 | Status: SHIPPED | OUTPATIENT
Start: 2021-04-10 | End: 2021-04-17

## 2021-04-10 RX ORDER — DEXAMETHASONE 6 MG/1
6 TABLET ORAL DAILY
Qty: 5 TABLET | Refills: 0 | Status: SHIPPED | OUTPATIENT
Start: 2021-04-11 | End: 2021-04-13 | Stop reason: SDUPTHER

## 2021-04-10 RX ORDER — FAMOTIDINE 20 MG/1
20 TABLET, FILM COATED ORAL 2 TIMES DAILY
Qty: 60 TABLET | Refills: 3 | Status: SHIPPED | OUTPATIENT
Start: 2021-04-10

## 2021-04-10 RX ORDER — FUROSEMIDE 40 MG/1
40 TABLET ORAL DAILY
Qty: 60 TABLET | Refills: 3 | Status: SHIPPED | OUTPATIENT
Start: 2021-04-11

## 2021-04-10 RX ORDER — CHOLECALCIFEROL (VITAMIN D3) 50 MCG
2000 TABLET ORAL DAILY
Qty: 60 TABLET | Refills: 0 | Status: SHIPPED | OUTPATIENT
Start: 2021-04-11

## 2021-04-10 RX ORDER — AMLODIPINE BESYLATE 5 MG/1
5 TABLET ORAL DAILY
Qty: 30 TABLET | Refills: 3 | Status: SHIPPED | OUTPATIENT
Start: 2021-04-11

## 2021-04-10 RX ADMIN — AMLODIPINE BESYLATE 5 MG: 5 TABLET ORAL at 09:06

## 2021-04-10 RX ADMIN — Medication 2000 UNITS: at 09:07

## 2021-04-10 RX ADMIN — FAMOTIDINE 20 MG: 20 TABLET ORAL at 09:06

## 2021-04-10 RX ADMIN — SODIUM CHLORIDE, PRESERVATIVE FREE 10 ML: 5 INJECTION INTRAVENOUS at 09:07

## 2021-04-10 RX ADMIN — DEXAMETHASONE 6 MG: 4 TABLET ORAL at 09:07

## 2021-04-10 RX ADMIN — MICONAZOLE NITRATE: 20 POWDER TOPICAL at 16:22

## 2021-04-10 RX ADMIN — FUROSEMIDE 40 MG: 40 TABLET ORAL at 09:06

## 2021-04-10 RX ADMIN — ALBUTEROL SULFATE 2 PUFF: 90 AEROSOL, METERED RESPIRATORY (INHALATION) at 08:25

## 2021-04-10 RX ADMIN — ALBUTEROL SULFATE 2 PUFF: 90 AEROSOL, METERED RESPIRATORY (INHALATION) at 12:04

## 2021-04-10 RX ADMIN — ALBUTEROL SULFATE 2 PUFF: 90 AEROSOL, METERED RESPIRATORY (INHALATION) at 15:22

## 2021-04-10 ASSESSMENT — PAIN SCALES - GENERAL
PAINLEVEL_OUTOF10: 0
PAINLEVEL_OUTOF10: 0

## 2021-04-10 NOTE — PROGRESS NOTES
Pulmonary and Critical Care  Progress Note    Subjective: The patient is better. On RA. Shortness of breath none. Chest pain none. Addressing respiratory complaints Patient is negative for  hemoptysis and cyanosis. CONSTITUTIONAL:  negative for fevers and chills. Past Medical History:     has a past medical history of Cardiac murmur, CLL (chronic lymphocytic leukemia) ([facility] [address] COPD (chronic obstructive pulmonary disease) ([facility] [address] and Macular degeneration. has a past surgical history that includes Inguinal hernia repair. reports that he has never smoked. He has never used smokeless tobacco. He reports that he does not drink alcohol or use drugs. Family history:  family history includes ADHD in his father; Diabetes in his mother. No Known Allergies  Social History:    Reviewed; no changes    Objective:   PHYSICAL EXAM:        VITALS:  /82   Pulse 92   Temp 97.4 °F (36.3 °C) (Oral)   Resp 24   Ht 5' 11.5\" (1.816 m)   Wt 165 lb 2 oz (74.9 kg)   SpO2 95%   BMI 22.71 kg/m²     24HR INTAKE/OUTPUT:      Intake/Output Summary (Last 24 hours) at 4/10/2021 1055  Last data filed at 4/10/2021 0600  Gross per 24 hour   Intake --   Output 800 ml   Net -800 ml       CONSTITUTIONAL:  awake, alert, cooperative, no apparent distress, and appears stated age  LUNGS:  decreased breath sounds, occ basilar crackles. CARDIOVASCULAR:  normal S1 and S2 and negative JVD  ABD:Abdomen soft, non-tender. BS normal. No masses,  No organomegaly  NEURO:Alert and oriented x3. Gait normal. Reflexes and motor strength normal and symmetric. Cranial nerves 2-12 and sensation grossly intact.   DATA:    CBC:  Recent Labs     04/08/21  0600 04/09/21  0619 04/10/21  0350   WBC 11.3* 16.7* 14.9*   RBC 5.34 5.91 5.65   HGB 15.6 17.4 16.9   HCT 50.1 54.1* 50.6    246 258   MCV 93.8 91.5 89.6   MCH 29.2 29.4 29.9   MCHC 31.1* 32.2 33.4   RDW 13.8 13.7 13.5      BMP:  Recent Labs     04/08/21  0600 04/09/21  0619 04/10/21  0350  142 141   K 4.7 4.9 4.4    107 107   CO2 25 24 22   BUN 39* 36* 36*   CREATININE 1.3 1.2 1.2   CALCIUM 8.2* 8.5 8.3   GLUCOSE 137* 134* 145*      ABG:  No results for input(s): PH, PO2ART, ZXG4HFA, HCO3, BEART, O2SAT in the last 72 hours. Lab Results   Component Value Date    PROBNP 3,963 (H) 04/09/2021    PROBNP 862.2 (H) 04/06/2021     No results found for: [facility]    Radiology Review:  Pertinent images / reports were reviewed as a part of this visit. Assessment:     Patient Active Problem List   Diagnosis    CLL (chronic lymphocytic leukemia) ([facility] [address]    COPD (chronic obstructive pulmonary disease) ([facility] [address]    Macular degeneration    Cardiac murmur    COVID-19       Plan:   1. Overall the patient is doing better. 2. Inc.activity.       Jannet Koch MD  4/10/2021  10:55 AM

## 2021-04-10 NOTE — PROGRESS NOTES
4/10/2021 12:53 PM  Patient Room #: 2019/2019-A  Patient Name: Shivani Howell    (Step 1 Done by RN if possible otherwise call Pulmonary Diagnostics)  1. Place patient on room air at rest for at least 30 minutes. If patient falls below 88% before 30 minutes then you can record the level and stop. Record room air saturation level __ %. If patient is at 88% or below, they will qualify for home oxygen and you can stop. If level does not fall below 88%, fill in level above. If indicated continue to Step 2. Signature:_Kourtney Peterson Date: 4/20/2021  (Step 2&3 Done by [facility])  2. Ambulate patient on room air until saturation falls below 89%. Record level of room air saturation with ambulation___ %. Next, place patient back on ___lpm oxygen and ambulate, record level __%. (Note:  this level must show improvement from room air level done with ambulation.)  If patients saturation on room air with ambulation is 88% or below AND patient shows improvement with oxygen during ambulation, they will qualify for home oxygen and you can stop. If patient does not drop below 89%, then patient should have an overnight oximetry trending on room air to see if level falls below 88%. Complete level in Step 3 below. 3. Room air overnight oximetry level 88 % for___  cumulative minutes. If patients room air oxygen level is < 89% for at least 5 cumulative minutes, patient will qualify for home oxygen and you can stop. (Attach Night Trending Report)    Complete order below: Diagnosis:_COVID   Home oxygen at:  Length of Need: x Lifetime ?  3 Months     ___lpm or __%   via  [x] nasal cannula  []mask  [] other:___         [x]continuous [x]  with activity  [x]  Nocturnal   [x] Portable Tanks [x]  Concentrator        Therapist Signature :Chantelle Diaz   Date:  4/10/2021  Physician Signature:  __Electronically Signed in EMR_ 4/10/2021     Physician Printed Name: Will Toribio MD   NPI # [id number]    [] Patient Qualifies      [] Patient Does NOT qualify

## 2021-04-10 NOTE — PLAN OF CARE
Problem: Airway Clearance - Ineffective  Goal: Achieve or maintain patent airway  4/9/2021 2336 by Qing Coulter RN  Outcome: Ongoing  4/9/2021 2335 by Qing Coulter RN  Outcome: Ongoing     Problem: Gas Exchange - Impaired  Goal: Absence of hypoxia  4/9/2021 2336 by Qing Coulter RN  Outcome: Ongoing  4/9/2021 2335 by Qing Coulter RN  Outcome: Ongoing  Goal: Promote optimal lung function  4/9/2021 2336 by Qing Coulter RN  Outcome: Ongoing  4/9/2021 2335 by Qing Coulter RN  Outcome: Ongoing     Problem: Breathing Pattern - Ineffective  Goal: Ability to achieve and maintain a regular respiratory rate  4/9/2021 2336 by Qing Coulter RN  Outcome: Ongoing  4/9/2021 2335 by Qing Coulter RN  Outcome: Ongoing     Problem:  Body Temperature -  Risk of, Imbalanced  Goal: Ability to maintain a body temperature within defined limits  4/9/2021 2336 by Qing Coulter RN  Outcome: Ongoing  4/9/2021 2335 by Qing Coulter RN  Outcome: Ongoing  Goal: Will regain or maintain usual level of consciousness  4/9/2021 2336 by Qing Coulter RN  Outcome: Ongoing  4/9/2021 2335 by Qing Coulter RN  Outcome: Ongoing  Goal: Complications related to the disease process, condition or treatment will be avoided or minimized  4/9/2021 2336 by Qing Coulter RN  Outcome: Ongoing  4/9/2021 2335 by Qing Coulter RN  Outcome: Ongoing     Problem: Isolation Precautions - Risk of Spread of Infection  Goal: Prevent transmission of infection  4/9/2021 2336 by Qing Coulter RN  Outcome: Ongoing  4/9/2021 2335 by Qing Coulter RN  Outcome: Ongoing     Problem: Nutrition Deficits  Goal: Optimize nutritional status  4/9/2021 2336 by Qing Coulter RN  Outcome: Ongoing  4/9/2021 2335 by Qing Coulter RN  Outcome: Ongoing     Problem: Risk for Fluid Volume Deficit  Goal: Maintain normal heart rhythm  4/9/2021 2336 by Qing Coulter RN  Outcome: Ongoing  4/9/2021 2335 by Qing Coulter RN  Outcome: Ongoing  Goal: Maintain absence of muscle cramping  4/9/2021 2336 by Lindsay James RN  Outcome: Ongoing  4/9/2021 2335 by Lindsay James RN  Outcome: Ongoing  Goal: Maintain normal serum potassium, sodium, calcium, phosphorus, and pH  4/9/2021 2336 by Lindsay James RN  Outcome: Ongoing  4/9/2021 2335 by Lindsay James RN  Outcome: Ongoing     Problem: Loneliness or Risk for Loneliness  Goal: Demonstrate positive use of time alone when socialization is not possible  4/9/2021 2336 by Lindsay James RN  Outcome: Ongoing  4/9/2021 2335 by Lindsay James RN  Outcome: Ongoing     Problem: Fatigue  Goal: Verbalize increase energy and improved vitality  4/9/2021 2336 by Lindsay James RN  Outcome: Ongoing  4/9/2021 2335 by Lindsay James RN  Outcome: Ongoing     Problem: Patient Education: Go to Patient Education Activity  Goal: Patient/Family Education  4/9/2021 2336 by Lindsay James RN  Outcome: Ongoing  4/9/2021 2335 by Lindsay James RN  Outcome: Ongoing

## 2021-04-10 NOTE — PROGRESS NOTES
Cardiology Progress Note     Today's Plan CPM    Admit Date:  4/6/2021    Consult reason/ Seen today for: atrial fib     Subjective and  Overnight Events:  Confused- pulled out IV       DNR CC A    Telemetry Atrial fib    Assessment / Plan / Recommendation:     1. VHD- heavily calcified aortic valve with severe AS with SHORTY 0.66- Recommend to follow up in the out patient setting- once recovered from [facility]  2. Atrial fib-paroxysmal -was in SR/SB on admission - now in atrial fib-rate is controlled-in the setting of VHD and acute infection -  recommend to continue with anticoagulation -on xarelto   3. HFpEF borderline EF 45 % LVH congestion on cxr/ elevated bpn -on low dose lasix   4. COVID 19 -per primary   5. HTN controlled on amlodipine     History of Presenting Illness:    Chief complain on admission : 80 y. o.year old who is admitted for  Chief Complaint   Patient presents with    Fatigue    Shortness of Breath        Past medical history:    has a past medical history of Cardiac murmur, CLL (chronic lymphocytic leukemia) ([facility] [address] COPD (chronic obstructive pulmonary disease) ([facility] [address] and Macular degeneration. Past surgical history:   has a past surgical history that includes Inguinal hernia repair. Social History:   reports that he has never smoked. He has never used smokeless tobacco. He reports that he does not drink alcohol or use drugs. Family history:  family history includes ADHD in his father; Diabetes in his mother.     No Known Allergies    Review of Systems:   All 14 systems were reviewed and are negative  Except for the positive findings which are documented     /82   Pulse 92   Temp 97.4 °F (36.3 °C) (Oral)   Resp 24   Ht 5' 11.5\" (1.816 m)   Wt 165 lb 2 oz (74.9 kg)   SpO2 96%   BMI 22.71 kg/m²       Intake/Output Summary (Last 24 hours) at 4/10/2021 1359  Last data filed at 4/10/2021 0600  Gross per 24 hour Intake --   Output 800 ml   Net -800 ml       Physical Exam:  Constitutional:  Well developed,  No acute distress  HENT:  Normocephalic, Atraumatic, Bilateral external ears normal,  Nose normal.   Neck- trachea is midline  Eyes:  PEERL, Conjunctiva normal  Respiratory:  decreased breath sounds, No respiratory distress, No wheezing, No chest tenderness. Cardiovascular:  IRRR, systolic  murmur appreciated, No rubs appreciated, No gallops appreciated, JVP not elevated  Abdomen/GI:  Soft, No tenderness  Musculoskeletal:  Intact distal pulses, no edema to lower legs  Integument:  Warm, Dry  Lymphatic:  No lymphadenopathy noted. Neurologic:  Alert & confused   Psychiatric:  Affect and Mood :pleasant     Medications:    furosemide  40 mg Oral Daily    albuterol sulfate HFA  2 puff Inhalation 4x daily    rivaroxaban  15 mg Oral Daily    ibrutinib  420 mg Oral Daily    amLODIPine  5 mg Oral Daily    famotidine  20 mg Oral BID    remdesivir IVPB  100 mg Intravenous Q24H    sodium chloride flush  5-40 mL Intravenous 2 times per day    dexamethasone  6 mg Oral Daily    Vitamin D  2,000 Units Oral Daily      sodium chloride       sodium chloride, sodium chloride flush, sodium chloride, promethazine **OR** ondansetron, polyethylene glycol, acetaminophen **OR** acetaminophen, potassium chloride **OR** potassium alternative oral replacement **OR** potassium chloride    Lab Data:  CBC:   Recent Labs     04/08/21  0600 04/09/21  0619 04/10/21  0350   WBC 11.3* 16.7* 14.9*   HGB 15.6 17.4 16.9   HCT 50.1 54.1* 50.6   MCV 93.8 91.5 89.6    246 258     BMP:   Recent Labs     04/08/21  0600 04/09/21  0619 04/10/21  0350    142 141   K 4.7 4.9 4.4    107 107   CO2 25 24 22   PHOS  --  2.9  --    BUN 39* 36* 36*   CREATININE 1.3 1.2 1.2     PT/INR: No results for input(s): PROTIME, INR in the last 72 hours.   BNP:    Recent Labs     04/09/21 0619   PROBNP 3,963*     TROPONIN:   Recent Labs 21  0619   TROPONINT <0.010        ECHO : 2021  Expedited imaging was used to obtain protocol images to reduce the   sonographer's exposure during COVID- pandemic. Left ventricular systolic function is abnormal.   Ejection fraction is visually estimated at 45%. Heavily calcified aortic valve with severe aortic stenosis; mean P   mmHG. SHORTY: 0.66 cm2. DVI: 0.2. Peak velocity: 394 cm2. Stroke volume: 49 mL. Mild aortic regurgitation noted with color Doppler. No evidence of any pericardial effusion. Impression:  Principal Problem:    COVID-19  Resolved Problems:    * No resolved hospital problems. *       All labs, medications and tests reviewed by myself, continue all other medications of all above medical condition listed as is except for changes mentioned above. Thank you   Please call with questions. Electronically signed by SLADE Sanchez CNP on 4/10/2021 at 1:59 PM     I have seen ,spoken to  and examined this patient personally, independently of the nurse practitioner. I have reviewed the hospital care given to date and reviewed all pertinent labs and imaging. The plan was developed mutually at the time of the visit with the patient,  NP  and myself. I have spoken with patient, nursing staff and provided written and verbal instructions . The above note has been reviewed and I agree with the assessment, diagnosis, and treatment plan with changes made by me as follows     CARDIOLOGY ATTENDING ADDENDUM    HPI:  I have reviewed the above HPI  And agree with above   Shilo Moyer is a 80 y. o.year old who and presents with had concerns including Fatigue and Shortness of Breath.   Chief Complaint   Patient presents with    Fatigue    Shortness of Breath     Interval history:  confused    Physical Exam:  General:  Awake,   Head:normal  Eye:normal  Neck:  No JVD   Chest:  Clear to auscultation, respiration easy  Cardiovascular:  RRR S1S2  Abdomen:   nontender  Extremities:  tr edema  Pulses; palpable  Neuro: grossly normal      MEDICAL DECISION MAKING;    I agree with the above plan, which was planned by myself and discussed with NP.   Conservative treatment        Yessica Kang MD [facility] [place]

## 2021-04-10 NOTE — PLAN OF CARE
Problem: Airway Clearance - Ineffective  Goal: Achieve or maintain patent airway  4/10/2021 1529 by Gelacio Key RN  Outcome: Completed  4/10/2021 0951 by Gelacio Key RN  Outcome: Ongoing     Problem: Gas Exchange - Impaired  Goal: Absence of hypoxia  4/10/2021 1529 by Gelacio Key RN  Outcome: Completed  4/10/2021 0951 by Gelacio Key RN  Outcome: Ongoing  Goal: Promote optimal lung function  4/10/2021 1529 by Gelacio Key RN  Outcome: Completed  4/10/2021 0951 by Gelacio Key RN  Outcome: Ongoing     Problem: Breathing Pattern - Ineffective  Goal: Ability to achieve and maintain a regular respiratory rate  4/10/2021 1529 by Gelacio Key RN  Outcome: Completed  4/10/2021 0951 by Gelacio Key RN  Outcome: Ongoing     Problem:  Body Temperature -  Risk of, Imbalanced  Goal: Ability to maintain a body temperature within defined limits  4/10/2021 1529 by Gelacio Key RN  Outcome: Completed  4/10/2021 0951 by Gelacio Key RN  Outcome: Ongoing  Goal: Will regain or maintain usual level of consciousness  4/10/2021 1529 by Gelacio Key RN  Outcome: Completed  4/10/2021 0951 by Gelacio Key RN  Outcome: Ongoing  Goal: Complications related to the disease process, condition or treatment will be avoided or minimized  4/10/2021 1529 by Gelacio Key RN  Outcome: Completed  4/10/2021 0951 by Gelacio Key RN  Outcome: Ongoing     Problem: Isolation Precautions - Risk of Spread of Infection  Goal: Prevent transmission of infection  4/10/2021 1529 by Gelacio Key RN  Outcome: Completed  4/10/2021 0951 by Gelacio Key RN  Outcome: Ongoing     Problem: Nutrition Deficits  Goal: Optimize nutritional status  4/10/2021 1529 by Gelacio Key RN  Outcome: Completed  4/10/2021 0951 by Gelacio eKy RN  Outcome: Ongoing     Problem: Risk for Fluid Volume Deficit  Goal: Maintain normal heart rhythm  4/10/2021 1529 by Gelacio Key RN  Outcome: Completed  4/10/2021 3131 by Ankita Aaron RN  Outcome: Ongoing  Goal: Maintain absence of muscle cramping  4/10/2021 1529 by Ankita Aaron RN  Outcome: Completed  4/10/2021 0951 by Ankita Aaron RN  Outcome: Ongoing  Goal: Maintain normal serum potassium, sodium, calcium, phosphorus, and pH  4/10/2021 1529 by Ankita Aaron RN  Outcome: Completed  4/10/2021 0951 by Ankita Aaron RN  Outcome: Ongoing     Problem: Loneliness or Risk for Loneliness  Goal: Demonstrate positive use of time alone when socialization is not possible  4/10/2021 1529 by Ankita Aaron RN  Outcome: Completed  4/10/2021 0951 by Ankita Aaron RN  Outcome: Ongoing     Problem: Fatigue  Goal: Verbalize increase energy and improved vitality  4/10/2021 1529 by Ankita Aaron RN  Outcome: Completed  4/10/2021 0951 by Ankita Aaron RN  Outcome: Ongoing     Problem: Patient Education: Go to Patient Education Activity  Goal: Patient/Family Education  4/10/2021 1529 by Ankita Aaron RN  Outcome: Completed  4/10/2021 0951 by Ankita Aaron RN  Outcome: Ongoing     Problem: Skin Integrity:  Goal: Will show no infection signs and symptoms  Description: Will show no infection signs and symptoms  4/10/2021 1529 by Ankita Aaron RN  Outcome: Completed  4/10/2021 0951 by Ankita Aaron RN  Outcome: Ongoing  Goal: Absence of new skin breakdown  Description: Absence of new skin breakdown  4/10/2021 1529 by Ankita Aaron RN  Outcome: Completed  4/10/2021 0951 by Ankita Aaron RN  Outcome: Ongoing     Problem: Falls - Risk of:  Goal: Will remain free from falls  Description: Will remain free from falls  4/10/2021 1529 by Ankita Aaron RN  Outcome: Completed  4/10/2021 0951 by Ankita Aaron RN  Outcome: Ongoing  Goal: Absence of physical injury  Description: Absence of physical injury  4/10/2021 1529 by Ankita Aaron RN  Outcome: Completed  4/10/2021 0951 by Ankita Aaron RN  Outcome: Ongoing

## 2021-04-10 NOTE — PROGRESS NOTES
Dr. William Sample would like the Pt ambulated on RA, and the oxygen removed to see if he de-sats,  Tech will ambulate the Pt.

## 2021-04-10 NOTE — PROGRESS NOTES
04/10/21 1205   Oxygen Therapy/Pulse Ox   O2 Therapy Room air   O2 Device None (Room air)   SpO2 96 %     Per Respiratory therapy process, Home oxygen order only good for 48Hrs    Patient was able to maintain saturations 97% with no oxygen , not able to walk possible fall risk.

## 2021-04-10 NOTE — PROGRESS NOTES
I discussed with Dr. Shannan Mancia about him. Clinically he is doing better and the plan is to discharge him. He is rising WBC is likely related to steroid. I recommend to continue with ibrutinib. I advised to follow-up with the cancer center in 3 to 4 weeks upon discharge. He is afebrile. Temperature 91.4, pulse 92, respiratory 24, blood pressure 135/82, saturation 95%. WBC was 14.9, hemoglobin 16.9, platelet 693. Assessment and plan:  1. He has CLL. I recommend to continue with ibrutinib. 2.  He has cough with pneumonia. Clinically improving. He will be discharged today. I recommend follow-up with the cancer center in 3 to 4 weeks.

## 2021-04-10 NOTE — DISCHARGE SUMMARY
Discharge Summary    Name:  Kim Ott /Age/Sex: 1930  (80 y.o. male)   MRN & CSN:  [id number] & [id number] Admission Date/Time: 2021  1:52 PM   Attending:  Julissa Churchill MD Discharging Physician: Julissa Churchill MD     Hospital Course:   Kim Ott is a 80 y.o.  male  who presents with COVID-19     He was admitted to the hospital due to shortness of breath. He has past medical history of CLL. He was initially managed as a case of acute hypoxemic respiratory failure due to COVID 19. Acute Hypoxic Respiratory Failure: Resolved  · due to COVID-19 pneumonia  · Requiring O2 via nasal cannula, saturation 96% at 3 L. Now at RA 96%. No hypoxemia on ambulation  · CXR mild left ventricular enlargement without interstitial edema. · Inflammatory markers elevated LD, slightly elevated CRP, slightly elevated D-dimer. · Given Tocilizumab x 1  · Remdesivir initially not given because of acute kidney injury, creatinine has improved. Given for 4 days  · Started on dexamethasone DC on 5  More days  · Pulmonology on consult.     New Onset Atrial Fibrillation, rate controlled  · Check electrolytes, Echocardiogram, TSH  · CHADSVASC 2, started on Rivaoxaban  · Cardiology on consult     Severe aortic stenosis with congestive heart failure exacerbation, systolic, acute  · Elevated BNP, chest x-ray with congestion. · started on Lasix daily. · Outpatient follow-up. 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     CLL  · Continue Ibrutinib. · Oncology on consult     Acute metabolic encephalopathy  · Could be from delirium, COVID-19  · Remote sitter     He was ambulated in the hallway without hypoxemia. PT/OT recommended home care. The patient expressed appropriate understanding of and agreement with the discharge recommendations, medications, and plan.      Consults this admission:  IP CONSULT TO HOSPITALIST  IP CONSULT TO PHARMACY  IP CONSULT TO ONCOLOGY  IP CONSULT TO PULMONOLOGY  IP CONSULT TO PHARMACY  IP CONSULT TO CARDIOLOGY    Discharge Instruction:   Follow up appointments: Oncology, Pulmonology, Cardiology  Primary care physician:  within 2 weeks    Diet:  regular diet   Activity: activity as tolerated  Disposition: Discharged to:   [x]Home, [][facility], []SNF, []Acute Rehab, []Hospice   Condition on discharge: Stable    Discharge Medications:      Geeta Hay   [place] Medication Instructions QIR:[id number]    Printed on:04/10/21 1312   Medication Information                      albuterol sulfate  (90 Base) MCG/ACT inhaler  Inhale 2 puffs into the lungs every 4 hours as needed for Wheezing             amLODIPine (NORVASC) 5 MG tablet  Take 1 tablet by mouth daily             dexamethasone (DECADRON) 6 MG tablet  Take 1 tablet by mouth daily for 5 days             famotidine (PEPCID) 20 MG tablet  Take 1 tablet by mouth 2 times daily             furosemide (LASIX) 40 MG tablet  Take 1 tablet by mouth daily             ibrutinib (IMBRUVICA) 420 MG tablet  Take 1 tablet by mouth daily             Multiple Vitamins-Minerals (THERAPEUTIC MULTIVITAMIN-MINERALS) tablet  Take 1 tablet by mouth daily             potassium chloride (KLOR-CON M) 10 MEQ extended release tablet  Take 1 tablet by mouth daily for 7 days             rivaroxaban (XARELTO) 15 MG TABS tablet  Take 1 tablet by mouth daily             Vitamin D (CHOLECALCIFEROL) 50 MCG (2000 UT) TABS tablet  Take 1 tablet by mouth daily                 Objective Findings at Discharge:   /82   Pulse 92   Temp 97.4 °F (36.3 °C) (Oral)   Resp 24   Ht 5' 11.5\" (1.816 m)   Wt 165 lb 2 oz (74.9 kg)   SpO2 96%   BMI 22.71 kg/m²            GEN    Awake male, sitting upright in bed in no apparent distress. Appears given age. Awake, less confused  EYES   Pupils are equally round.   No scleral erythema, discharge, or conjunctivitis. HENT  Mucous membranes are moist. Oral pharynx without exudates, no evidence of thrush. NECK  Supple, no apparent thyromegaly or masses. RESP  crackles bilaterally  CARDIO/VASC           Irregularly irregular  GI        Abdomen is soft without significant tenderness, masses, or guarding. Bowel sounds are normoactive. Rectal exam deferred. MSK    No gross joint deformities.       BMP/CBC  Recent Labs     04/08/21  0600 04/09/21  0619 04/10/21  0350    142 141   K 4.7 4.9 4.4    107 107   CO2 25 24 22   BUN 39* 36* 36*   CREATININE 1.3 1.2 1.2   WBC 11.3* 16.7* 14.9*   HCT 50.1 54.1* 50.6    246 258       IMAGING:  Echocardiogram Complete 2d With Doppler With Color    Result Date: 4/9/2021  Transthoracic Echocardiography Report (TTE)  Demographics   Patient Name       Ander BHAT  Date of Study       04/08/2021   Date of Birth      01/24/1930         Gender              Male   Age                80 year(s)         Race                   Patient Number     [id number]         Room Number         2019   Visit Number       [id number]   Corporate ID       [id number]   Accession Number   [id number]         Sonographer         Saw Wong RVT, OLY   Ordering Physician Buddy Raymond MD                 Physician           Raymundo Machado MD  Procedure Type of Study   TTE procedure:ECHOCARDIOGRAM COMPLETE 2D W DOPPLER W COLOR. Procedure Date Date: 04/08/2021 Start: 02:32 PM Study Location: Portable Technical Quality: Adequate visualization Indications:Atrial fibrillation. Patient Status: Routine Height: 70 inches Weight: 170 pounds BSA: 1.95 m2 BMI: 24.39 kg/m2 HR: 90 bpm BP: 116/68 mmHg  Conclusions   Summary  Expedited imaging was used to obtain protocol images to reduce the  sonographer's exposure during COVID-19 pandemic.   Left ventricular systolic function is abnormal.  Ejection fraction is visually estimated at 45%. Heavily calcified aortic valve with severe aortic stenosis; mean P  mmHG. SHORTY: 0.66 cm2. DVI: 0.2. Peak velocity: 394 cm2. Stroke volume: 49 mL. Mild aortic regurgitation noted with color Doppler. No evidence of any pericardial effusion. Signature   ------------------------------------------------------------------  Electronically signed by Arabella Menard MD  (Interpreting physician) on 2021 at 12:49 PM  ------------------------------------------------------------------   Findings   Left Ventricle  Left ventricular systolic function is abnormal.  Ejection fraction is visually estimated at 45%. Mild left ventricular hypertrophy. Right Ventricle  Essentially normal right ventricle. Aortic Valve  Heavily calcified aortic valve with severe aortic stenosis; mean P  mmHG. SHORTY: 0.66 cm2. DVI: 0.2. Peak velocity: 394 cm2. Stroke volume: 49 mL. Mild aortic regurgitation noted with color Doppler. Mitral Valve  Structurally normal mitral valve. Tricuspid Valve  Trace tricuspid regurgitation is noted. Pulmonic Valve  The pulmonic valve was not well visualized. Pericardial Effusion  No evidence of any pericardial effusion. Pleural Effusion  No evidence of pleural effusion.   M-Mode/2D Measurements & Calculations   LV Diastolic Dimension:   LV Systolic Dimension:  0.69 cm                   3.52 cm  LV FS:23.6 %              LV Volume Diastolic: 146  LV PW Diastolic: 4.57 cm  ml                       RV Diastolic Dimension:  LV PW Systolic: 0.06 cm   LV Volume Systolic: 46   3.51 cm  Septum Diastolic: 8.34 cm ml  Septum Systolic: 1.79 cm  LV EDV/LV EDV Index: 120  CO: 5.3 l/min             ml/62 m2LV ESV/LV ESV  CI: 2.72 l/m*m2           Index: 46 ml/24 m2                            EF Calculated (A4C):  LV Area Diastolic: 70.5   93.6 %  cm2                       EF Calculated (2D): 52.7  LV Area Systolic: 36.6 %  cm2                            LV Length: 8.11 cm                             LVOT: 2.2 cm  Doppler Measurements & Calculations    AV Peak Velocity: 393 cm/s    LVOT Peak Velocity: 79.7 cm/s   AV Peak Gradient: 61.78 mmHg  LVOT Mean Velocity: 61.1 cm/s   AV Mean Velocity: 312 cm/s    LVOT Peak Gradient: 3 mmHgLVOT Mean Gradient:   AV Mean Gradient: 41 mmHg     2 mmHg   AV VTI: 72 cm   AV Area (Continuity):0.82 cm2    LVOT VTI: 15.5 cm      Xr Chest Portable    Result Date: 4/9/2021  EXAMINATION: ONE XRAY VIEW OF THE CHEST 4/9/2021 8:30 am COMPARISON: April 6, 2021 HISTORY: ORDERING SYSTEM PROVIDED HISTORY: ARF, elevated BNP TECHNOLOGIST PROVIDED HISTORY: Reason for exam:->ARF, elevated BNP Reason for Exam: ARF, elevated BNP Acuity: Acute Type of Exam: Subsequent/Follow-up FINDINGS: The cardiomediastinal silhouette is stable. There is bilateral apical scarring. There are increased lung markings bilaterally, may be related to mild pulmonary vascular congestion versus bronchitis or early pneumonia. There is discoid atelectasis at the bilateral lung bases. There is mild right pleural effusion. There is no pneumothorax. There is no acute osseous abnormality. Stable chest. Increased lung markings bilaterally, may be related to mild pulmonary vascular congestion versus bronchitis or early pneumonia. Bilateral apical scarring. Discoid atelectasis at the bilateral lung bases. Mild right pleural effusion. Xr Chest Portable    Result Date: 4/6/2021  EXAMINATION: ONE XRAY VIEW OF THE CHEST 4/6/2021 1:30 pm COMPARISON: None. HISTORY: ORDERING SYSTEM PROVIDED HISTORY: SOB TECHNOLOGIST PROVIDED HISTORY: Reason for exam:->SOB Reason for Exam: sob Acuity: Acute Type of Exam: Initial Additional signs and symptoms: Pt to ED with complaints of shortness of breath and generalized weakness x several days FINDINGS: Mild left ventricular enlargement was noted without interstitial edema.  Discoid atelectasis and or scarring was noted in both lower lobes. Elevation of the right hemidiaphragm was noted. No hilar mass was noted. Left apical pleural thickening was identified. The regional skeleton was osteopenic. Mild left ventricular enlargement without interstitial edema. Discoid atelectasis and/or scarring in both lower lobes.        Discharge Time of 40 minutes    Electronically signed by Lauryn Carr MD on 4/10/2021 at 1:12 PM

## 2021-04-10 NOTE — PROGRESS NOTES
Report given to Pt son via phone due to the Pt experiencing slight confusion. Pt son to sign upon discharge in lobby.

## 2021-04-10 NOTE — PLAN OF CARE
Problem: Airway Clearance - Ineffective  Goal: Achieve or maintain patent airway  4/10/2021 0951 by Naldo Burgess RN  Outcome: Ongoing  4/9/2021 2336 by Manuel Carlin RN  Outcome: Ongoing  4/9/2021 2335 by Manuel Carlin RN  Outcome: Ongoing     Problem: Gas Exchange - Impaired  Goal: Absence of hypoxia  4/10/2021 0951 by Naldo Burgess RN  Outcome: Ongoing  4/9/2021 2336 by Manuel Carlin RN  Outcome: Ongoing  4/9/2021 2335 by Manuel Carlin RN  Outcome: Ongoing  Goal: Promote optimal lung function  4/10/2021 0951 by Naldo Burgess RN  Outcome: Ongoing  4/9/2021 2336 by Manuel Carlin RN  Outcome: Ongoing  4/9/2021 2335 by Manuel Carlin RN  Outcome: Ongoing     Problem: Breathing Pattern - Ineffective  Goal: Ability to achieve and maintain a regular respiratory rate  4/10/2021 0951 by Naldo Burgess RN  Outcome: Ongoing  4/9/2021 2336 by Manuel Carlin RN  Outcome: Ongoing  4/9/2021 2335 by Manuel Carlin RN  Outcome: Ongoing     Problem:  Body Temperature -  Risk of, Imbalanced  Goal: Ability to maintain a body temperature within defined limits  4/10/2021 0951 by Naldo Burgess, RN  Outcome: Ongoing  4/9/2021 2336 by Manuel Carlin RN  Outcome: Ongoing  4/9/2021 2335 by Manuel Carlin RN  Outcome: Ongoing  Goal: Will regain or maintain usual level of consciousness  4/10/2021 0951 by Naldo Burgess, RN  Outcome: Ongoing  4/9/2021 2336 by Manuel Carlin RN  Outcome: Ongoing  4/9/2021 2335 by Manuel Carlin RN  Outcome: Ongoing  Goal: Complications related to the disease process, condition or treatment will be avoided or minimized  4/10/2021 0951 by Naldo Burgess, RN  Outcome: Ongoing  4/9/2021 2336 by Manuel Carlin RN  Outcome: Ongoing  4/9/2021 2335 by Manuel Carlin RN  Outcome: Ongoing     Problem: Isolation Precautions - Risk of Spread of Infection  Goal: Prevent transmission of infection  4/10/2021 0951 by Naldo Burgess RN  Outcome: Ongoing  4/9/2021 2336 by Qing Coulter RN  Outcome: Ongoing  4/9/2021 2335 by Qing Coulter RN  Outcome: Ongoing     Problem: Nutrition Deficits  Goal: Optimize nutritional status  4/10/2021 0951 by Bharti Lara RN  Outcome: Ongoing  4/9/2021 2336 by Qing Coulter RN  Outcome: Ongoing  4/9/2021 2335 by Qing Coulter RN  Outcome: Ongoing     Problem: Risk for Fluid Volume Deficit  Goal: Maintain normal heart rhythm  4/10/2021 0951 by Bharti Lara RN  Outcome: Ongoing  4/9/2021 2336 by Qing Coulter RN  Outcome: Ongoing  4/9/2021 2335 by Qing Coulter RN  Outcome: Ongoing  Goal: Maintain absence of muscle cramping  4/10/2021 0951 by Bharti Lara RN  Outcome: Ongoing  4/9/2021 2336 by Qing Coulter RN  Outcome: Ongoing  4/9/2021 2335 by Qing Coulter RN  Outcome: Ongoing  Goal: Maintain normal serum potassium, sodium, calcium, phosphorus, and pH  4/10/2021 0951 by Bharti Lara RN  Outcome: Ongoing  4/9/2021 2336 by Qing Coulter RN  Outcome: Ongoing  4/9/2021 2335 by Qing Coulter RN  Outcome: Ongoing     Problem: Loneliness or Risk for Loneliness  Goal: Demonstrate positive use of time alone when socialization is not possible  4/10/2021 0951 by Bharti Lara RN  Outcome: Ongoing  4/9/2021 2336 by Qing Coulter RN  Outcome: Ongoing  4/9/2021 2335 by Qing Coulter RN  Outcome: Ongoing     Problem: Fatigue  Goal: Verbalize increase energy and improved vitality  4/10/2021 0951 by Bharti Lara RN  Outcome: Ongoing  4/9/2021 2336 by iQng Coulter RN  Outcome: Ongoing  4/9/2021 2335 by Qing Coulter RN  Outcome: Ongoing     Problem: Patient Education: Go to Patient Education Activity  Goal: Patient/Family Education  4/10/2021 7437 by Bharti Lara RN  Outcome: Ongoing  4/9/2021 2336 by Qing Coulter RN  Outcome: Ongoing  4/9/2021 2335 by Nocatee Him, RN  Outcome: Ongoing     Problem: Skin Integrity:  Goal: Will show no infection signs and symptoms  Description: Will show no infection signs and symptoms  Outcome: Ongoing  Goal: Absence of new skin breakdown  Description: Absence of new skin breakdown  Outcome: Ongoing     Problem: Falls - Risk of:  Goal: Will remain free from falls  Description: Will remain free from falls  Outcome: Ongoing  Goal: Absence of physical injury  Description: Absence of physical injury  Outcome: Ongoing

## 2021-04-12 ENCOUNTER — CARE COORDINATION (OUTPATIENT)
Dept: CASE MANAGEMENT | Age: 86
End: 2021-04-12

## 2021-04-12 ENCOUNTER — TELEPHONE (OUTPATIENT)
Dept: INTERNAL MEDICINE CLINIC | Age: 86
End: 2021-04-12

## 2021-04-13 ENCOUNTER — VIRTUAL VISIT (OUTPATIENT)
Dept: INTERNAL MEDICINE CLINIC | Age: 86
End: 2021-04-13
Payer: MEDICARE

## 2021-04-13 DIAGNOSIS — I35.0 SEVERE AORTIC STENOSIS: ICD-10-CM

## 2021-04-13 DIAGNOSIS — U07.1 PNEUMONIA DUE TO COVID-19 VIRUS: Primary | ICD-10-CM

## 2021-04-13 DIAGNOSIS — J41.0 SIMPLE CHRONIC BRONCHITIS (HCC): ICD-10-CM

## 2021-04-13 DIAGNOSIS — N17.9 AKI (ACUTE KIDNEY INJURY) (HCC): ICD-10-CM

## 2021-04-13 DIAGNOSIS — J96.01 ACUTE RESPIRATORY FAILURE WITH HYPOXEMIA (HCC): ICD-10-CM

## 2021-04-13 DIAGNOSIS — I48.91 ATRIAL FIBRILLATION, NEW ONSET (HCC): ICD-10-CM

## 2021-04-13 DIAGNOSIS — C91.10 CLL (CHRONIC LYMPHOCYTIC LEUKEMIA) (HCC): ICD-10-CM

## 2021-04-13 DIAGNOSIS — J12.82 PNEUMONIA DUE TO COVID-19 VIRUS: Primary | ICD-10-CM

## 2021-04-13 LAB
EKG ATRIAL RATE: 61 BPM
EKG DIAGNOSIS: NORMAL
EKG P AXIS: 42 DEGREES
EKG P-R INTERVAL: 176 MS
EKG Q-T INTERVAL: 460 MS
EKG QRS DURATION: 104 MS
EKG QTC CALCULATION (BAZETT): 463 MS
EKG R AXIS: -1 DEGREES
EKG T AXIS: 47 DEGREES
EKG VENTRICULAR RATE: 61 BPM

## 2021-04-13 PROCEDURE — 99496 TRANSJ CARE MGMT HIGH F2F 7D: CPT | Performed by: INTERNAL MEDICINE

## 2021-04-13 PROCEDURE — 1111F DSCHRG MED/CURRENT MED MERGE: CPT | Performed by: INTERNAL MEDICINE

## 2021-04-13 RX ORDER — DEXAMETHASONE 6 MG/1
6 TABLET ORAL DAILY
Qty: 5 TABLET | Refills: 0 | Status: SHIPPED | OUTPATIENT
Start: 2021-04-13 | End: 2021-04-18

## 2021-04-13 RX ORDER — AZITHROMYCIN 250 MG/1
250 TABLET, FILM COATED ORAL SEE ADMIN INSTRUCTIONS
Qty: 6 TABLET | Refills: 0 | Status: SHIPPED | OUTPATIENT
Start: 2021-04-13 | End: 2021-04-18

## 2021-04-13 NOTE — PROGRESS NOTES
Post-Discharge Transitional Care Management Services or Hospital Follow Up      Johnna Zavala   YOB: 1930    Date of Office Visit:  4/13/2021  Date of Hospital Admission: 4/6/21  Date of Hospital Discharge: 4/10/21  Readmission Risk Score(high >=14%. Medium >=10%):Readmission Risk Score: 18      Care management risk score Rising risk (score 2-5) and Complex Care (Scores >=6): 1     Non face to face  following discharge, date last encounter closed (first attempt may have been earlier): 4/12/2021  5:52 PM 4/12/2021  5:52 PM    Call initiated 2 business days of discharge: Yes     Patient Active Problem List   Diagnosis    CLL (chronic lymphocytic leukemia) ([facility] [address]    COPD (chronic obstructive pulmonary disease) ([facility] [address]    Macular degeneration    Cardiac murmur    COVID-19       No Known Allergies    Medications listed as ordered at the time of discharge from hospital   [facility]   [place] Medication Instructions RUDI:    Printed on:04/13/21 1040   Medication Information                      albuterol sulfate  (90 Base) MCG/ACT inhaler  Inhale 2 puffs into the lungs every 4 hours as needed for Wheezing             amLODIPine (NORVASC) 5 MG tablet  Take 1 tablet by mouth daily             dexamethasone (DECADRON) 6 MG tablet  Take 1 tablet by mouth daily for 5 days             famotidine (PEPCID) 20 MG tablet  Take 1 tablet by mouth 2 times daily             furosemide (LASIX) 40 MG tablet  Take 1 tablet by mouth daily             ibrutinib (IMBRUVICA) 420 MG tablet  Take 1 tablet by mouth daily             miconazole (MICOTIN) 2 % powder  Apply topically 2 times daily.              Multiple Vitamins-Minerals (THERAPEUTIC MULTIVITAMIN-MINERALS) tablet  Take 1 tablet by mouth daily             potassium chloride (KLOR-CON M) 10 MEQ extended release tablet  Take 1 tablet by mouth daily for 7 days             rivaroxaban (XARELTO) 15 MG TABS tablet  Take 1 tablet by mouth daily Vitamin D (CHOLECALCIFEROL) 50 MCG (2000 UT) TABS tablet  Take 1 tablet by mouth daily                   Medications marked \"taking\" at this time  Outpatient Medications Marked as Taking for the 4/13/21 encounter (Virtual Visit) with Juan Jose Merrill MD   Medication Sig Dispense Refill    amLODIPine (NORVASC) 5 MG tablet Take 1 tablet by mouth daily 30 tablet 3    albuterol sulfate  (90 Base) MCG/ACT inhaler Inhale 2 puffs into the lungs every 4 hours as needed for Wheezing 1 Inhaler 3    dexamethasone (DECADRON) 6 MG tablet Take 1 tablet by mouth daily for 5 days 5 tablet 0    famotidine (PEPCID) 20 MG tablet Take 1 tablet by mouth 2 times daily 60 tablet 3    furosemide (LASIX) 40 MG tablet Take 1 tablet by mouth daily 60 tablet 3    rivaroxaban (XARELTO) 15 MG TABS tablet Take 1 tablet by mouth daily 42 tablet 0    Vitamin D (CHOLECALCIFEROL) 50 MCG (2000 UT) TABS tablet Take 1 tablet by mouth daily 60 tablet 0    potassium chloride (KLOR-CON M) 10 MEQ extended release tablet Take 1 tablet by mouth daily for 7 days 7 tablet 0    miconazole (MICOTIN) 2 % powder Apply topically 2 times daily. 45 g 1    ibrutinib (IMBRUVICA) 420 MG tablet Take 1 tablet by mouth daily 30 tablet 5    Multiple Vitamins-Minerals (THERAPEUTIC MULTIVITAMIN-MINERALS) tablet Take 1 tablet by mouth daily          Medications patient taking as of now reconciled against medications ordered at time of hospital discharge: Yes    Chief Complaint   Patient presents with   [address] from Hospital       HPI    Inpatient course: Discharge summary reviewed- see chart. Lea Baker is a 80 y.o.  male  who presents with COVID-19      He was admitted to the hospital due to shortness of breath. He has past medical history of CLL.  He was initially managed as a case of acute hypoxemic respiratory failure due to COVID 19.      Acute Hypoxic Respiratory Failure: Resolved  · due to COVID-19 pneumonia  · Requiring O2 via nasal cannula, saturation 96% at 3 L. Now at RA 96%. No hypoxemia on ambulation  · CXR mild left ventricular enlargement without interstitial edema. · Inflammatory markers elevated LD, slightly elevated CRP, slightly elevated D-dimer. · Given Tocilizumab x 1  · Remdesivir initially not given because of acute kidney injury, creatinine has improved. Given for 4 days  · Started on dexamethasone DC on 5  More days  · Pulmonology on consult.     New Onset Atrial Fibrillation, rate controlled  · Check electrolytes, Echocardiogram, TSH  · CHADSVASC 2, started on Rivaoxaban  · Cardiology on consult     Severe aortic stenosis with congestive heart failure exacerbation, systolic, acute  · Elevated BNP, chest x-ray with congestion. · started on Lasix daily. · Outpatient follow-up. 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     CLL  · Continue Ibrutinib. · Oncology on consult     Acute metabolic encephalopathy  · Could be from delirium, COVID-19  · Remote sitter     He was ambulated in the hallway without hypoxemia. PT/OT recommended home care.      The patient expressed appropriate understanding of and agreement with the discharge recommendations, medications, and plan.        Interval history/Current status: sats ok ra 96%. Not had HHC come out yet. Still w loose cough, very weak. Denies fever or chills. Denies nausea, vomiting. Aortic stenosis and also afib, is on anticoagulation. Is sched to see Dr Aden Vivar also for eval, may be candidate for TAVR    Prior metabol encephalopathy has improved. CLL stable counts and seeing Dr Jorge Luis Zayas, due for f/u w him May. Review of Systems    There were no vitals filed for this visit. There is no height or weight on file to calculate BMI.    Wt Readings from Last 3 Encounters:   04/10/21 165 lb 2 oz (74.9 kg)   01/12/21 174 lb (78.9 kg)   10/20/20 173 lb (78.5 kg)     BP Readings from Last 3 Encounters:   04/10/21 134/86   01/12/21 (!) 168/92   10/20/20 128/72       Physical Exam  Constitutional:       Appearance: Normal appearance. Pulmonary:      Comments: Mild resp distress, sl dyspneic  Neurological:      General: No focal deficit present. Mental Status: He is alert. Psychiatric:         Mood and Affect: Mood normal.             Assessment/Plan:  1. Pneumonia due to COVID-19 virus  sats stable but still w SOB and coughing. At high risk for secondary pneumonia, we'll add zpak and one more course decadron. [facility], I'll reassess one week. If cont distress we'll also consider for possible alb nebs. No o2 needed at this point.  - dexamethasone (DECADRON) 6 MG tablet; Take 1 tablet by mouth daily for 5 days  Dispense: 5 tablet; Refill: 0  - azithromycin (ZITHROMAX) 250 MG tablet; Take 1 tablet by mouth See Admin Instructions for 5 days 500mg on day 1 followed by 250mg on days 2 - 5  Dispense: 6 tablet; Refill: [address]    2. Acute respiratory failure with hypoxemia ([facility] [address]  Plan as noted, sats stable, [facility] eval  - dexamethasone (DECADRON) 6 MG tablet; Take 1 tablet by mouth daily for 5 days  Dispense: 5 tablet; Refill: 0  - azithromycin (ZITHROMAX) 250 MG tablet; Take 1 tablet by mouth See Admin Instructions for 5 days 500mg on day 1 followed by 250mg on days 2 - 5  Dispense: 6 tablet; Refill: [address]    3. Atrial fibrillation, new onset ([address]  On anticoagulation, for f/u cardio  - [facility] [place]    4. Severe aortic stenosis  Family to discuss further options w Dr Tracy Terry, ?possible TAVR? 5. INES (acute kidney injury) ([address]  Last creat stable, after [facility] eval likely will plan f/u labs. 6. CLL (chronic lymphocytic leukemia) ([facility] [address]  F/u oncology as noted  - Good Ramirez MD, Hematology Oncology, [place]    7.  Simple chronic bronchitis ([facility] [address]  Plan as noted, monitor        Medical Decision Making: high complexity

## 2021-04-14 ENCOUNTER — CARE COORDINATION (OUTPATIENT)
Dept: CASE MANAGEMENT | Age: 86
End: 2021-04-14

## 2021-04-14 NOTE — CARE COORDINATION
[facility] Transitions Follow Up Call    2021    Patient: Taylor Query  Patient : 1930   MRN: <[id number]>  Reason for Admission: [address]  Discharge Date: 4/10/21 RARS: Readmission Risk Score: 18     Left HIPPA compliant message regarding the nature of the call and a request for a return call with my contact information      ALVAREZ Carmona, RN [facility] / [facility] Transition Nurse         Spoke with: lvm    Care Transitions Subsequent and Final Call    Subsequent and Final Calls  Care Transitions Interventions  Other Interventions:            Follow Up  Future Appointments   Date Time Provider Tavia Franco   2021  9:15 AM MD Handy Cochran [facility]   2021 11:30 AM [facility] NURSE [facility] Jose Hernandez   2021 11:45 AM Federica Aquino MD [facility]   2021  3:00 PM Nikita Wilson MD [facility]       Malorie Chakraborty RN